# Patient Record
Sex: FEMALE | Race: WHITE | NOT HISPANIC OR LATINO | Employment: UNEMPLOYED | URBAN - METROPOLITAN AREA
[De-identification: names, ages, dates, MRNs, and addresses within clinical notes are randomized per-mention and may not be internally consistent; named-entity substitution may affect disease eponyms.]

---

## 2017-05-17 ENCOUNTER — LAB (OUTPATIENT)
Dept: LAB | Facility: HOSPITAL | Age: 57
End: 2017-05-17
Attending: SPECIALIST

## 2017-05-17 ENCOUNTER — TRANSCRIBE ORDERS (OUTPATIENT)
Dept: LAB | Facility: HOSPITAL | Age: 57
End: 2017-05-17

## 2017-05-17 DIAGNOSIS — F31.70 MIXED BIPOLAR I DISORDER IN REMISSION (HCC): Primary | ICD-10-CM

## 2017-05-17 DIAGNOSIS — F31.70 MIXED BIPOLAR I DISORDER IN REMISSION (HCC): ICD-10-CM

## 2017-05-17 LAB
ALBUMIN SERPL-MCNC: 4.2 G/DL (ref 3.5–5.2)
ALBUMIN/GLOB SERPL: 1.6 G/DL
ALP SERPL-CCNC: 52 U/L (ref 39–117)
ALT SERPL W P-5'-P-CCNC: 17 U/L (ref 1–33)
ANION GAP SERPL CALCULATED.3IONS-SCNC: 12.2 MMOL/L
AST SERPL-CCNC: 15 U/L (ref 1–32)
BILIRUB SERPL-MCNC: 0.2 MG/DL (ref 0.1–1.2)
BUN BLD-MCNC: 18 MG/DL (ref 6–20)
BUN/CREAT SERPL: 14.6 (ref 7–25)
CALCIUM SPEC-SCNC: 10 MG/DL (ref 8.6–10.5)
CHLORIDE SERPL-SCNC: 103 MMOL/L (ref 98–107)
CO2 SERPL-SCNC: 24.8 MMOL/L (ref 22–29)
CREAT BLD-MCNC: 1.23 MG/DL (ref 0.57–1)
GFR SERPL CREATININE-BSD FRML MDRD: 46 ML/MIN/1.73
GLOBULIN UR ELPH-MCNC: 2.7 GM/DL
GLUCOSE BLD-MCNC: 132 MG/DL (ref 65–99)
LITHIUM SERPL-SCNC: 0.7 MMOL/L (ref 0.6–1.2)
POTASSIUM BLD-SCNC: 4.4 MMOL/L (ref 3.5–5.2)
PROT SERPL-MCNC: 6.9 G/DL (ref 6–8.5)
SODIUM BLD-SCNC: 140 MMOL/L (ref 136–145)
T3 SERPL-MCNC: 63.7 NG/DL (ref 80–200)
T4 SERPL-MCNC: 5.81 MCG/DL (ref 4.5–11.7)
TSH SERPL DL<=0.05 MIU/L-ACNC: 2.95 MIU/ML (ref 0.27–4.2)

## 2017-05-17 PROCEDURE — 84480 ASSAY TRIIODOTHYRONINE (T3): CPT

## 2017-05-17 PROCEDURE — 36415 COLL VENOUS BLD VENIPUNCTURE: CPT

## 2017-05-17 PROCEDURE — 80178 ASSAY OF LITHIUM: CPT

## 2017-05-17 PROCEDURE — 84436 ASSAY OF TOTAL THYROXINE: CPT

## 2017-05-17 PROCEDURE — 80053 COMPREHEN METABOLIC PANEL: CPT

## 2017-05-17 PROCEDURE — 84443 ASSAY THYROID STIM HORMONE: CPT

## 2017-05-24 RX ORDER — PROPRANOLOL HYDROCHLORIDE 160 MG/1
CAPSULE, EXTENDED RELEASE ORAL
Qty: 90 CAPSULE | Refills: 1 | Status: SHIPPED | OUTPATIENT
Start: 2017-05-24 | End: 2017-11-20 | Stop reason: SDUPTHER

## 2017-05-24 RX ORDER — BROMOCRIPTINE MESYLATE 2.5 MG/1
TABLET ORAL
Qty: 360 TABLET | Refills: 1 | Status: SHIPPED | OUTPATIENT
Start: 2017-05-24 | End: 2017-11-20 | Stop reason: SDUPTHER

## 2017-05-24 RX ORDER — SIMVASTATIN 40 MG
TABLET ORAL
Qty: 90 TABLET | Refills: 1 | Status: SHIPPED | OUTPATIENT
Start: 2017-05-24 | End: 2017-11-20 | Stop reason: SDUPTHER

## 2017-08-08 DIAGNOSIS — Z00.00 HEALTHCARE MAINTENANCE: Primary | ICD-10-CM

## 2017-08-08 DIAGNOSIS — E22.1 HYPERPROLACTINEMIA (HCC): ICD-10-CM

## 2017-08-08 DIAGNOSIS — E03.9 HYPOTHYROIDISM, UNSPECIFIED TYPE: ICD-10-CM

## 2017-08-08 DIAGNOSIS — E78.2 MIXED HYPERLIPIDEMIA: ICD-10-CM

## 2017-08-08 DIAGNOSIS — D75.89 MACROCYTOSIS: ICD-10-CM

## 2017-08-12 LAB
ALBUMIN SERPL-MCNC: 4.3 G/DL (ref 3.5–5.2)
ALBUMIN/GLOB SERPL: 1.5 G/DL
ALP SERPL-CCNC: 47 U/L (ref 39–117)
ALT SERPL-CCNC: 20 U/L (ref 1–33)
APPEARANCE UR: CLEAR
AST SERPL-CCNC: 13 U/L (ref 1–32)
BACTERIA #/AREA URNS HPF: ABNORMAL /HPF
BACTERIA UR CULT: NORMAL
BACTERIA UR CULT: NORMAL
BASOPHILS # BLD AUTO: 0.04 10*3/MM3 (ref 0–0.2)
BASOPHILS NFR BLD AUTO: 0.4 % (ref 0–1.5)
BILIRUB SERPL-MCNC: 0.2 MG/DL (ref 0.1–1.2)
BILIRUB UR QL STRIP: NEGATIVE
BUN SERPL-MCNC: 18 MG/DL (ref 6–20)
BUN/CREAT SERPL: 14.5 (ref 7–25)
CALCIUM SERPL-MCNC: 10 MG/DL (ref 8.6–10.5)
CHLORIDE SERPL-SCNC: 105 MMOL/L (ref 98–107)
CHOLEST SERPL-MCNC: 200 MG/DL (ref 0–200)
CO2 SERPL-SCNC: 25.4 MMOL/L (ref 22–29)
COLOR UR: YELLOW
CREAT SERPL-MCNC: 1.24 MG/DL (ref 0.57–1)
EOSINOPHIL # BLD AUTO: 0.27 10*3/MM3 (ref 0–0.7)
EOSINOPHIL NFR BLD AUTO: 3 % (ref 0.3–6.2)
EPI CELLS #/AREA URNS HPF: ABNORMAL /HPF
ERYTHROCYTE [DISTWIDTH] IN BLOOD BY AUTOMATED COUNT: 14.7 % (ref 11.7–13)
FOLATE SERPL-MCNC: 9.24 NG/ML (ref 4.78–24.2)
GLOBULIN SER CALC-MCNC: 2.8 GM/DL
GLUCOSE SERPL-MCNC: 91 MG/DL (ref 65–99)
GLUCOSE UR QL: NEGATIVE
HCT VFR BLD AUTO: 41.3 % (ref 35.6–45.5)
HCV AB S/CO SERPL IA: <0.1 S/CO RATIO (ref 0–0.9)
HDLC SERPL-MCNC: 69 MG/DL (ref 40–60)
HGB BLD-MCNC: 12.6 G/DL (ref 11.9–15.5)
HGB UR QL STRIP: NEGATIVE
IMM GRANULOCYTES # BLD: 0.03 10*3/MM3 (ref 0–0.03)
IMM GRANULOCYTES NFR BLD: 0.3 % (ref 0–0.5)
KETONES UR QL STRIP: NEGATIVE
LDLC SERPL CALC-MCNC: 102 MG/DL (ref 0–100)
LEUKOCYTE ESTERASE UR QL STRIP: ABNORMAL
LYMPHOCYTES # BLD AUTO: 2.65 10*3/MM3 (ref 0.9–4.8)
LYMPHOCYTES NFR BLD AUTO: 29.4 % (ref 19.6–45.3)
MCH RBC QN AUTO: 31 PG (ref 26.9–32)
MCHC RBC AUTO-ENTMCNC: 30.5 G/DL (ref 32.4–36.3)
MCV RBC AUTO: 101.5 FL (ref 80.5–98.2)
MICRO URNS: ABNORMAL
MONOCYTES # BLD AUTO: 0.83 10*3/MM3 (ref 0.2–1.2)
MONOCYTES NFR BLD AUTO: 9.2 % (ref 5–12)
MUCOUS THREADS URNS QL MICRO: PRESENT /HPF
NEUTROPHILS # BLD AUTO: 5.19 10*3/MM3 (ref 1.9–8.1)
NEUTROPHILS NFR BLD AUTO: 57.7 % (ref 42.7–76)
NITRITE UR QL STRIP: NEGATIVE
PH UR STRIP: 6 [PH] (ref 5–7.5)
PLATELET # BLD AUTO: 207 10*3/MM3 (ref 140–500)
POTASSIUM SERPL-SCNC: 4.6 MMOL/L (ref 3.5–5.2)
PROT SERPL-MCNC: 7.1 G/DL (ref 6–8.5)
PROT UR QL STRIP: NEGATIVE
RBC # BLD AUTO: 4.07 10*6/MM3 (ref 3.9–5.2)
RBC #/AREA URNS HPF: ABNORMAL /HPF
SODIUM SERPL-SCNC: 142 MMOL/L (ref 136–145)
SP GR UR: 1.01 (ref 1–1.03)
T4 FREE SERPL-MCNC: 1.43 NG/DL (ref 0.93–1.7)
TRIGL SERPL-MCNC: 146 MG/DL (ref 0–150)
TSH SERPL DL<=0.005 MIU/L-ACNC: 3.89 MIU/ML (ref 0.27–4.2)
URINALYSIS REFLEX: ABNORMAL
UROBILINOGEN UR STRIP-MCNC: 0.2 MG/DL (ref 0.2–1)
VIT B12 SERPL-MCNC: 494 PG/ML (ref 211–946)
VLDLC SERPL CALC-MCNC: 29.2 MG/DL (ref 5–40)
WBC # BLD AUTO: 9.01 10*3/MM3 (ref 4.5–10.7)
WBC #/AREA URNS HPF: ABNORMAL /HPF

## 2017-08-17 ENCOUNTER — OFFICE VISIT (OUTPATIENT)
Dept: INTERNAL MEDICINE | Facility: CLINIC | Age: 57
End: 2017-08-17

## 2017-08-17 VITALS
HEIGHT: 69 IN | DIASTOLIC BLOOD PRESSURE: 70 MMHG | OXYGEN SATURATION: 98 % | WEIGHT: 206 LBS | SYSTOLIC BLOOD PRESSURE: 115 MMHG | BODY MASS INDEX: 30.51 KG/M2 | RESPIRATION RATE: 12 BRPM | TEMPERATURE: 97.9 F | HEART RATE: 66 BPM

## 2017-08-17 DIAGNOSIS — R26.89 LOSS OF BALANCE: ICD-10-CM

## 2017-08-17 DIAGNOSIS — F31.9 BIPOLAR 1 DISORDER (HCC): ICD-10-CM

## 2017-08-17 DIAGNOSIS — E22.1 HYPERPROLACTINEMIA (HCC): ICD-10-CM

## 2017-08-17 DIAGNOSIS — Z12.31 ENCOUNTER FOR SCREENING MAMMOGRAM FOR MALIGNANT NEOPLASM OF BREAST: ICD-10-CM

## 2017-08-17 DIAGNOSIS — F25.0 SCHIZOAFFECTIVE DISORDER, BIPOLAR TYPE (HCC): ICD-10-CM

## 2017-08-17 DIAGNOSIS — G47.33 OBSTRUCTIVE SLEEP APNEA SYNDROME: ICD-10-CM

## 2017-08-17 DIAGNOSIS — D75.89 MACROCYTOSIS: ICD-10-CM

## 2017-08-17 DIAGNOSIS — R06.09 DYSPNEA ON EXERTION: ICD-10-CM

## 2017-08-17 DIAGNOSIS — G25.0 HEREDITARY ESSENTIAL TREMOR: ICD-10-CM

## 2017-08-17 DIAGNOSIS — Z00.00 HEALTHCARE MAINTENANCE: Primary | ICD-10-CM

## 2017-08-17 DIAGNOSIS — E03.9 HYPOTHYROIDISM, UNSPECIFIED TYPE: ICD-10-CM

## 2017-08-17 DIAGNOSIS — R42 DIZZINESS: ICD-10-CM

## 2017-08-17 DIAGNOSIS — E78.2 MIXED HYPERLIPIDEMIA: ICD-10-CM

## 2017-08-17 PROCEDURE — 99396 PREV VISIT EST AGE 40-64: CPT | Performed by: INTERNAL MEDICINE

## 2017-08-17 PROCEDURE — G0439 PPPS, SUBSEQ VISIT: HCPCS | Performed by: INTERNAL MEDICINE

## 2017-08-17 RX ORDER — ASENAPINE MALEATE 10 MG/1
TABLET SUBLINGUAL
COMMUNITY
Start: 2017-07-13

## 2017-08-17 NOTE — PROGRESS NOTES
"Subjective       Chief Complaint   Patient presents with   • Annual Exam     review of medical issues       HPI:  Kisha Bourgeois is a 56 y.o. female RTC in yearly CPE/ AWV, review of medical issues:  Has been doing well. Notes is 'tired all the time'.  \"Dr. Betts tells me it is likely my Saphris\".   1. Dizziness - persists. Has not really changed or gone away.   2. Hypothyroidism - adjusted dosing after last labs in 2/2016. Check TSH/ free T4 today.   3. Bipolar/ Schizoaffective D/O - sees psych, with prominent anxiety sx though. Had bipolar episode in 3/2017 and had brief increase in Spahris.  Remains on increased dose alprazolam per psych. Feels like mood is controlled at this time and really episode stemmed from stress over care of elderly mother.   4. Hypothyroidism - on levothyroxine daily on empty stomach.  No recent missed doses.   4. BET - still on propranolol. Thinks tremor has been getting worse. Feels like notices it a lot with picking up a glass or hand writing. Thinks wants to increases.    5. MUNIRA - recalls intolerant to CPAP in past.  Called dentist and talked about MAD. Told it was going to be very expensive.  Not sure of insurance coverage.  Decided not to get MAD device.  Is still snoring at times and  notes is issue when pt sleeps on back.   6. GRANADO - still has sx at times and at times has some challenge getting full deep breath in.  No wheezing noted.  \"It is just like I cant take a deep breath\".   had negative stress test, PFT's inconclusive, CXR normal in past. Had some mild shunting with PFO on ECHO but no recs to intervene on shunt. I suspect sx due to MUNIRA, and tx plan as noted above.   7. HLD - on statin. No issues.    8. Hyperprolactinemia - on bromocriptine, no changes.     The following portions of the patient's history were reviewed and updated as appropriate: allergies, current medications, past family history, past medical history, past social history, past surgical history " and problem list.    Review of Systems   Constitution: Positive for malaise/fatigue and weight gain. Negative for chills and fever.   HENT: Negative for congestion, headaches, hearing loss, odynophagia and sore throat.    Eyes: Negative for discharge, double vision, pain and redness.        Last eye exam 5/2016; wears glasses and contacts     Cardiovascular: Positive for dyspnea on exertion. Negative for chest pain, irregular heartbeat, leg swelling, near-syncope, palpitations and syncope.   Respiratory: Positive for sleep disturbances due to breathing and snoring. Negative for cough, shortness of breath and wheezing.    Skin: Negative for rash and suspicious lesions.   Musculoskeletal: Positive for stiffness (in knees, loosens quickly within minutes). Negative for joint pain, joint swelling, muscle cramps and muscle weakness.   Gastrointestinal: Negative for constipation, diarrhea, dysphagia, heartburn, nausea and vomiting.   Genitourinary: Negative for dysuria, frequency and hematuria.   Neurological: Positive for dizziness (no change) and light-headedness (no change).   Psychiatric/Behavioral: Negative for depression. The patient does not have insomnia and is not nervous/anxious.        Patient Care Team:  Mark Sanchez MD as PCP - General  Mark Sanchez MD as PCP - Family Medicine    Recent Hospitalizations: No recent hospitalization(s).    Depression Screen:   PHQ-9 Depression Screening 8/17/2017   Little interest or pleasure in doing things 1   Feeling down, depressed, or hopeless 1   Trouble falling or staying asleep, or sleeping too much 1   Feeling tired or having little energy 1   Poor appetite or overeating 0   Feeling bad about yourself - or that you are a failure or have let yourself or your family down 0   Trouble concentrating on things, such as reading the newspaper or watching television 1   Moving or speaking so slowly that other people could have noticed. Or the opposite - being so fidgety or  "restless that you have been moving around a lot more than usual 0   Thoughts that you would be better off dead, or of hurting yourself in some way 0   PHQ-9 Total Score 5   If you checked off any problems, how difficult have these problems made it for you to do your work, take care of things at home, or get along with other people? Not difficult at all       Functional and Cognitive Screening:  Functional & Cognitive Status 8/17/2017   Do you have difficulty preparing food and eating? No   Do you have difficulty bathing yourself? No   Do you have difficulty getting dressed? No   Do you have difficulty using the toilet? No   Do you have difficulty moving around from place to place? No   In the past year have you fallen or experienced a near fall? No   Do you need help using the phone?  No   Are you deaf or do you have serious difficulty hearing?  No   Do you need help with transportation? No   Do you need help shopping? No   Do you need help preparing meals?  No   Do you need help with housework?  No   Do you need help with laundry? No   Do you need help taking your medications? No   Do you have difficulty concentrating, remembering or making decisions? No       Does the patient have evidence of cognitive impairment? no    Does not need ASA (and currently is not on it)    Vitals:    08/17/17 1453   BP: 115/70   Pulse: 66   Resp: 12   Temp: 97.9 °F (36.6 °C)   SpO2: 98%   Weight: 206 lb (93.4 kg)   Height: 69\" (175.3 cm)   PainSc: 0-No pain       Body mass index is 30.42 kg/(m^2).    Visual Acuity:  No exam data present    Advanced Care Planning:  has an advance directive - a copy HAS NOT been provided. Have asked the patient to send this to us to add to record.    Objective   Physical Exam   Constitutional: She is oriented to person, place, and time. She appears well-developed and well-nourished. No distress.   HENT:   Head: Normocephalic and atraumatic.   Right Ear: Hearing, tympanic membrane, external ear and ear " canal normal.   Left Ear: Hearing, tympanic membrane, external ear and ear canal normal.   Nose: Nose normal.   Mouth/Throat: Uvula is midline, oropharynx is clear and moist and mucous membranes are normal. No oropharyngeal exudate.   Large brad on hard palate     Eyes: Conjunctivae, EOM and lids are normal. Pupils are equal, round, and reactive to light.   Neck: Trachea normal, normal range of motion and full passive range of motion without pain. Neck supple. Carotid bruit is not present. No thyroid mass and no thyromegaly present.   Cardiovascular: Normal rate, regular rhythm, S1 normal, S2 normal, normal heart sounds and intact distal pulses.  Exam reveals no gallop and no friction rub.    No murmur heard.  Pulses:       Radial pulses are 2+ on the right side, and 2+ on the left side.        Dorsalis pedis pulses are 2+ on the right side, and 2+ on the left side.        Posterior tibial pulses are 2+ on the right side, and 2+ on the left side.   Pulmonary/Chest: Effort normal and breath sounds normal. No respiratory distress. She has no wheezes. She has no rhonchi. She has no rales. She exhibits no mass. Right breast exhibits no inverted nipple, no mass, no nipple discharge and no skin change. Left breast exhibits no inverted nipple, no mass, no nipple discharge and no skin change.   Chaperone present for breast exam   Abdominal: Soft. Normal appearance and bowel sounds are normal. She exhibits no distension and no mass. There is no hepatosplenomegaly. There is no tenderness. There is no rebound and no guarding.   Musculoskeletal: Normal range of motion. She exhibits no edema.       Vascular Status -  Her exam exhibits right foot vasculature abnormal and no right foot edema. Her exam exhibits left foot vasculature abnormal and no left foot edema.  Lymphadenopathy:     She has no cervical adenopathy.     She has no axillary adenopathy.        Right: No inguinal adenopathy present.        Left: No inguinal  adenopathy present.   Neurological: She is alert and oriented to person, place, and time. She has normal strength and normal reflexes. She displays tremor (R > L hand action tremor, no rest component). No cranial nerve deficit or sensory deficit. She exhibits normal muscle tone. Gait normal.   Skin: Skin is warm and dry. No rash noted.   Psychiatric: She has a normal mood and affect. Her behavior is normal. Cognition and memory are normal.   Vitals reviewed.      Assessment/Plan   Problems Addressed this Visit     Bipolar 1 disorder    Relevant Medications    Vortioxetine HBr (TRINTELLIX) 10 MG tablet    SAPHRIS 10 MG sublingual tablet sublingual tablet    Dizziness    Dyspnea on exertion    Hereditary essential tremor    Hyperlipidemia    Hyperprolactinemia    Hypothyroidism    Loss of balance    Macrocytosis    Obstructive sleep apnea syndrome    Relevant Orders    Ambulatory Referral to Sleep Medicine    Schizoaffective disorder    Relevant Medications    Vortioxetine HBr (TRINTELLIX) 10 MG tablet    SAPHRIS 10 MG sublingual tablet sublingual tablet      Other Visit Diagnoses     Healthcare maintenance    -  Primary    Relevant Orders    Mammo Screening Bilateral With CAD    Encounter for screening mammogram for malignant neoplasm of breast         Relevant Orders    Mammo Screening Bilateral With CAD              Diagnoses and all orders for this visit:    Healthcare maintenance  -     Mammo Screening Bilateral With CAD; Future    Macrocytosis    Hypothyroidism, unspecified type    Hereditary essential tremor    Bipolar 1 disorder    Dizziness    Dyspnea on exertion    Mixed hyperlipidemia    Hyperprolactinemia    Obstructive sleep apnea syndrome  -     Ambulatory Referral to Sleep Medicine    Schizoaffective disorder, bipolar type    Loss of balance    Encounter for screening mammogram for malignant neoplasm of breast   -     Mammo Screening Bilateral With CAD; Future    Other orders  -     Vortioxetine HBr  (TRINTELLIX) 10 MG tablet; Take  by mouth Daily.  -     SAPHRIS 10 MG sublingual tablet sublingual tablet;         Kisha Bourgeois is a 56 y.o. female RTC in yearly CPE/ AWV, review of medical issues:   1. Dizziness - persists. Has had negative vestibular testing and has d/w psychiatry in distant past. Pt had stress ECHO 6/2014, small PFO noted but no intervention rec'd by Cards. Holter with only rare extra beats in 6/2014. MRI 2013 f/u on microadenoma with stable findings. Orthostatics negative in office in past.  saw neurology 5/2016 with no specific dx other than suggestions of tx for essential tremor. Etiology remains unclear but issue is not progressive and will monitor for now.   2. Fatigue - main complaint today.  Complex issue as I feel is mutlifactorial including med side effects vs. Relative bradycarida B-blocker effect vs. Untreated MUNIRA.  I have urged pt to revisit the MUNIRA issue and consider tx options.     3. Hypothyroidism - TSH at goal today.  C/W same levothyroxine dosing.   4. Bipolar/ Schizoaffective D/O with prominent anxiety sx element - managed per psych on multidrug regimen. Had bipolar episode in 3/2017 with brief increase in Saphris.  I d/w her potential effect of untreated MUNIRA on mental health and urged consideration of tx.    5. Essential Tremor - progressive despite high dose B-blocker.  Tx challenging as HR limits increase in B-blocker. Pt fatigue makes nearly all other options (Primidone, gabapentin, schedule Clonzepam) challenging.  In addition, Primidone has signficant interactions with Depakote and Trintellix requiring coordination with psych. I have asked pt to discuss options with psych and, if agreeable, release psych to discuss case with me so we may work together on choosing med augmentation.   6. MUNIRA -intolerant to CPAP in past and adamant she cannot do that.  Concerned about cost of dental device. We discussed untreated MUNIRA at length today and comorbid issues as noted above.  Pt  agrees to return to sleep med and discuss alternative tx options. Referral placed.   7. GRANADO - long hx with past negative stress test, PFT's inconclusive, CXR normal in past. Had some mild shunting with PFO on ECHO but no recs to intervene on shunt. I suspect sx due to MUNIRA, and tx plan as noted above.   8. HLD - LDL at goal on statin. LFT's OK.     9. Hyperprolactinemia - on bromocriptine.  Had stable f/u MRI in 2014 after likely false elevation of Prolactin by psych meds.    10. Gastritis - noted on 2013 EGD, but path negative. BAILEE.  11. Macrocytosis - slight progression on labs with no associated cytopenias.  B12 normal on labs.  Monitor.   12. HM - labs d/w pt; Flu - this season; Tdap - UTD; C-scope 2013 (1 polyp) --> 5 years; Pap per gyn due in 2018; Breast exam OK, Mammo ordered today;  add exercise; Preventative care plan provided to pt at end of visit      Return for Annual physical.          Current Outpatient Prescriptions:   •  ALPRAZolam (XANAX) 0.5 MG tablet, Take  by mouth., Disp: , Rfl:   •  bromocriptine (PARLODEL) 2.5 MG tablet, TAKE 4 TABLETS DAILY AT BEDTIME, Disp: 360 tablet, Rfl: 1  •  buPROPion SR (WELLBUTRIN SR) 150 MG 12 hr tablet, Take 3 tablets by mouth daily., Disp: , Rfl:   •  divalproex (DEPAKOTE) 250 MG 24 hr tablet, Take  by mouth., Disp: , Rfl:   •  levothyroxine (SYNTHROID, LEVOTHROID) 100 MCG tablet, Take 1 tablet by mouth daily., Disp: 30 tablet, Rfl: 5  •  lithium (ESKALITH) 450 MG CR tablet, Take  by mouth., Disp: , Rfl:   •  propranolol LA (INDERAL LA) 160 MG 24 hr capsule, TAKE 1 CAPSULE DAILY, Disp: 90 capsule, Rfl: 1  •  simvastatin (ZOCOR) 40 MG tablet, TAKE 1 TABLET DAILY AS DIRECTED, Disp: 90 tablet, Rfl: 1  •  Vortioxetine HBr (TRINTELLIX) 10 MG tablet, Take  by mouth Daily., Disp: , Rfl:   •  SAPHRIS 10 MG sublingual tablet sublingual tablet, , Disp: , Rfl:

## 2017-08-31 ENCOUNTER — HOSPITAL ENCOUNTER (OUTPATIENT)
Dept: SLEEP MEDICINE | Facility: HOSPITAL | Age: 57
Discharge: HOME | End: 2017-08-31
Admitting: NURSE PRACTITIONER

## 2017-08-31 DIAGNOSIS — G47.10 HYPERSOMNIA: Primary | ICD-10-CM

## 2017-08-31 PROCEDURE — G0463 HOSPITAL OUTPT CLINIC VISIT: HCPCS

## 2017-10-25 ENCOUNTER — TRANSCRIBE ORDERS (OUTPATIENT)
Dept: ADMINISTRATIVE | Facility: HOSPITAL | Age: 57
End: 2017-10-25

## 2017-10-25 ENCOUNTER — LAB (OUTPATIENT)
Dept: LAB | Facility: HOSPITAL | Age: 57
End: 2017-10-25
Attending: SPECIALIST

## 2017-10-25 DIAGNOSIS — F31.70 MIXED BIPOLAR I DISORDER IN REMISSION (HCC): ICD-10-CM

## 2017-10-25 DIAGNOSIS — F31.70 MIXED BIPOLAR I DISORDER IN REMISSION (HCC): Primary | ICD-10-CM

## 2017-10-25 LAB
ALBUMIN SERPL-MCNC: 4.4 G/DL (ref 3.5–5.2)
ALBUMIN/GLOB SERPL: 1.6 G/DL
ALP SERPL-CCNC: 55 U/L (ref 39–117)
ALT SERPL W P-5'-P-CCNC: 19 U/L (ref 1–33)
ANION GAP SERPL CALCULATED.3IONS-SCNC: 13.8 MMOL/L
AST SERPL-CCNC: 14 U/L (ref 1–32)
BILIRUB SERPL-MCNC: 0.2 MG/DL (ref 0.1–1.2)
BUN BLD-MCNC: 20 MG/DL (ref 6–20)
BUN/CREAT SERPL: 16.7 (ref 7–25)
CALCIUM SPEC-SCNC: 10.1 MG/DL (ref 8.6–10.5)
CHLORIDE SERPL-SCNC: 105 MMOL/L (ref 98–107)
CO2 SERPL-SCNC: 23.2 MMOL/L (ref 22–29)
CREAT BLD-MCNC: 1.2 MG/DL (ref 0.57–1)
GFR SERPL CREATININE-BSD FRML MDRD: 47 ML/MIN/1.73
GLOBULIN UR ELPH-MCNC: 2.7 GM/DL
GLUCOSE BLD-MCNC: 81 MG/DL (ref 65–99)
LITHIUM SERPL-SCNC: 0.7 MMOL/L (ref 0.6–1.2)
POTASSIUM BLD-SCNC: 4.2 MMOL/L (ref 3.5–5.2)
PROT SERPL-MCNC: 7.1 G/DL (ref 6–8.5)
SODIUM BLD-SCNC: 142 MMOL/L (ref 136–145)
T4 SERPL-MCNC: 6.02 MCG/DL (ref 4.5–11.7)
TSH SERPL DL<=0.05 MIU/L-ACNC: 6.88 MIU/ML (ref 0.27–4.2)

## 2017-10-25 PROCEDURE — 36415 COLL VENOUS BLD VENIPUNCTURE: CPT

## 2017-10-25 PROCEDURE — 80178 ASSAY OF LITHIUM: CPT

## 2017-10-25 PROCEDURE — 84436 ASSAY OF TOTAL THYROXINE: CPT

## 2017-10-25 PROCEDURE — 80053 COMPREHEN METABOLIC PANEL: CPT

## 2017-10-25 PROCEDURE — 84443 ASSAY THYROID STIM HORMONE: CPT

## 2017-11-20 RX ORDER — BROMOCRIPTINE MESYLATE 2.5 MG/1
TABLET ORAL
Qty: 360 TABLET | Refills: 1 | Status: SHIPPED | OUTPATIENT
Start: 2017-11-20 | End: 2020-03-17 | Stop reason: SDUPTHER

## 2017-11-20 RX ORDER — SIMVASTATIN 40 MG
TABLET ORAL
Qty: 90 TABLET | Refills: 1 | Status: SHIPPED | OUTPATIENT
Start: 2017-11-20 | End: 2020-09-08 | Stop reason: SDUPTHER

## 2017-11-20 RX ORDER — PROPRANOLOL HYDROCHLORIDE 160 MG/1
CAPSULE, EXTENDED RELEASE ORAL
Qty: 90 CAPSULE | Refills: 1 | Status: SHIPPED | OUTPATIENT
Start: 2017-11-20 | End: 2020-03-17 | Stop reason: SDUPTHER

## 2018-04-30 ENCOUNTER — APPOINTMENT (OUTPATIENT)
Dept: CT IMAGING | Facility: HOSPITAL | Age: 58
End: 2018-04-30

## 2018-04-30 ENCOUNTER — APPOINTMENT (OUTPATIENT)
Dept: GENERAL RADIOLOGY | Facility: HOSPITAL | Age: 58
End: 2018-04-30

## 2018-04-30 ENCOUNTER — HOSPITAL ENCOUNTER (EMERGENCY)
Facility: HOSPITAL | Age: 58
Discharge: HOME | End: 2018-04-30
Attending: EMERGENCY MEDICINE | Admitting: EMERGENCY MEDICINE

## 2018-04-30 VITALS
WEIGHT: 195 LBS | HEART RATE: 68 BPM | TEMPERATURE: 98.4 F | SYSTOLIC BLOOD PRESSURE: 143 MMHG | HEIGHT: 69 IN | BODY MASS INDEX: 28.88 KG/M2 | DIASTOLIC BLOOD PRESSURE: 88 MMHG | RESPIRATION RATE: 16 BRPM | OXYGEN SATURATION: 99 %

## 2018-04-30 DIAGNOSIS — R53.1 GENERALIZED WEAKNESS: Primary | ICD-10-CM

## 2018-04-30 DIAGNOSIS — F31.9 BIPOLAR 1 DISORDER (HCC): ICD-10-CM

## 2018-04-30 LAB
ALBUMIN SERPL-MCNC: 3.8 G/DL (ref 3.5–5.2)
ALBUMIN/GLOB SERPL: 1.5 G/DL
ALP SERPL-CCNC: 52 U/L (ref 39–117)
ALT SERPL W P-5'-P-CCNC: 15 U/L (ref 1–33)
ANION GAP SERPL CALCULATED.3IONS-SCNC: 6.8 MMOL/L
AST SERPL-CCNC: 15 U/L (ref 1–32)
BASOPHILS # BLD AUTO: 0.02 10*3/MM3 (ref 0–0.2)
BASOPHILS NFR BLD AUTO: 0.3 % (ref 0–1.5)
BILIRUB SERPL-MCNC: <0.2 MG/DL (ref 0.1–1.2)
BILIRUB UR QL STRIP: NEGATIVE
BUN BLD-MCNC: 13 MG/DL (ref 6–20)
BUN/CREAT SERPL: 11.7 (ref 7–25)
CALCIUM SPEC-SCNC: 10.1 MG/DL (ref 8.6–10.5)
CHLORIDE SERPL-SCNC: 108 MMOL/L (ref 98–107)
CLARITY UR: CLEAR
CO2 SERPL-SCNC: 27.2 MMOL/L (ref 22–29)
COLOR UR: YELLOW
CREAT BLD-MCNC: 1.11 MG/DL (ref 0.57–1)
DEPRECATED RDW RBC AUTO: 52.1 FL (ref 37–54)
EOSINOPHIL # BLD AUTO: 0.1 10*3/MM3 (ref 0–0.7)
EOSINOPHIL NFR BLD AUTO: 1.5 % (ref 0.3–6.2)
ERYTHROCYTE [DISTWIDTH] IN BLOOD BY AUTOMATED COUNT: 14.1 % (ref 11.7–13)
GFR SERPL CREATININE-BSD FRML MDRD: 51 ML/MIN/1.73
GLOBULIN UR ELPH-MCNC: 2.6 GM/DL
GLUCOSE BLD-MCNC: 97 MG/DL (ref 65–99)
GLUCOSE BLDC GLUCOMTR-MCNC: 91 MG/DL (ref 70–130)
GLUCOSE UR STRIP-MCNC: NEGATIVE MG/DL
HCT VFR BLD AUTO: 38 % (ref 35.6–45.5)
HGB BLD-MCNC: 11.6 G/DL (ref 11.9–15.5)
HGB UR QL STRIP.AUTO: NEGATIVE
HOLD SPECIMEN: NORMAL
HOLD SPECIMEN: NORMAL
IMM GRANULOCYTES # BLD: 0.02 10*3/MM3 (ref 0–0.03)
IMM GRANULOCYTES NFR BLD: 0.3 % (ref 0–0.5)
KETONES UR QL STRIP: NEGATIVE
LEUKOCYTE ESTERASE UR QL STRIP.AUTO: NEGATIVE
LITHIUM SERPL-SCNC: 1 MMOL/L (ref 0.6–1.2)
LYMPHOCYTES # BLD AUTO: 1.86 10*3/MM3 (ref 0.9–4.8)
LYMPHOCYTES NFR BLD AUTO: 27.5 % (ref 19.6–45.3)
MAGNESIUM SERPL-MCNC: 2.8 MG/DL (ref 1.6–2.6)
MCH RBC QN AUTO: 30.7 PG (ref 26.9–32)
MCHC RBC AUTO-ENTMCNC: 30.5 G/DL (ref 32.4–36.3)
MCV RBC AUTO: 100.5 FL (ref 80.5–98.2)
MONOCYTES # BLD AUTO: 0.61 10*3/MM3 (ref 0.2–1.2)
MONOCYTES NFR BLD AUTO: 9 % (ref 5–12)
NEUTROPHILS # BLD AUTO: 4.16 10*3/MM3 (ref 1.9–8.1)
NEUTROPHILS NFR BLD AUTO: 61.4 % (ref 42.7–76)
NITRITE UR QL STRIP: NEGATIVE
PH UR STRIP.AUTO: 6 [PH] (ref 5–8)
PLATELET # BLD AUTO: 197 10*3/MM3 (ref 140–500)
PMV BLD AUTO: 9.9 FL (ref 6–12)
POTASSIUM BLD-SCNC: 4.6 MMOL/L (ref 3.5–5.2)
PROT SERPL-MCNC: 6.4 G/DL (ref 6–8.5)
PROT UR QL STRIP: NEGATIVE
RBC # BLD AUTO: 3.78 10*6/MM3 (ref 3.9–5.2)
SODIUM BLD-SCNC: 142 MMOL/L (ref 136–145)
SP GR UR STRIP: 1.01 (ref 1–1.03)
TROPONIN T SERPL-MCNC: <0.01 NG/ML (ref 0–0.03)
UROBILINOGEN UR QL STRIP: NORMAL
VALPROATE SERPL-MCNC: 39 MCG/ML (ref 50–125)
WBC NRBC COR # BLD: 6.77 10*3/MM3 (ref 4.5–10.7)
WHOLE BLOOD HOLD SPECIMEN: NORMAL
WHOLE BLOOD HOLD SPECIMEN: NORMAL

## 2018-04-30 PROCEDURE — 93005 ELECTROCARDIOGRAM TRACING: CPT

## 2018-04-30 PROCEDURE — 70450 CT HEAD/BRAIN W/O DYE: CPT

## 2018-04-30 PROCEDURE — 81003 URINALYSIS AUTO W/O SCOPE: CPT | Performed by: EMERGENCY MEDICINE

## 2018-04-30 PROCEDURE — 84484 ASSAY OF TROPONIN QUANT: CPT | Performed by: EMERGENCY MEDICINE

## 2018-04-30 PROCEDURE — 85025 COMPLETE CBC W/AUTO DIFF WBC: CPT | Performed by: EMERGENCY MEDICINE

## 2018-04-30 PROCEDURE — 93005 ELECTROCARDIOGRAM TRACING: CPT | Performed by: EMERGENCY MEDICINE

## 2018-04-30 PROCEDURE — 71045 X-RAY EXAM CHEST 1 VIEW: CPT

## 2018-04-30 PROCEDURE — 80164 ASSAY DIPROPYLACETIC ACD TOT: CPT

## 2018-04-30 PROCEDURE — 93010 ELECTROCARDIOGRAM REPORT: CPT | Performed by: INTERNAL MEDICINE

## 2018-04-30 PROCEDURE — 80178 ASSAY OF LITHIUM: CPT | Performed by: EMERGENCY MEDICINE

## 2018-04-30 PROCEDURE — 82962 GLUCOSE BLOOD TEST: CPT

## 2018-04-30 PROCEDURE — 99284 EMERGENCY DEPT VISIT MOD MDM: CPT

## 2018-04-30 PROCEDURE — 80053 COMPREHEN METABOLIC PANEL: CPT | Performed by: EMERGENCY MEDICINE

## 2018-04-30 PROCEDURE — 83735 ASSAY OF MAGNESIUM: CPT | Performed by: EMERGENCY MEDICINE

## 2018-04-30 RX ORDER — SODIUM CHLORIDE 0.9 % (FLUSH) 0.9 %
10 SYRINGE (ML) INJECTION AS NEEDED
Status: DISCONTINUED | OUTPATIENT
Start: 2018-04-30 | End: 2018-04-30 | Stop reason: HOSPADM

## 2018-05-01 ENCOUNTER — TELEPHONE (OUTPATIENT)
Dept: SOCIAL WORK | Facility: HOSPITAL | Age: 58
End: 2018-05-01

## 2018-05-14 ENCOUNTER — TRANSCRIBE ORDERS (OUTPATIENT)
Dept: LAB | Facility: HOSPITAL | Age: 58
End: 2018-05-14

## 2018-05-14 ENCOUNTER — OFFICE VISIT (OUTPATIENT)
Dept: INTERNAL MEDICINE | Facility: CLINIC | Age: 58
End: 2018-05-14

## 2018-05-14 ENCOUNTER — LAB (OUTPATIENT)
Dept: LAB | Facility: HOSPITAL | Age: 58
End: 2018-05-14
Attending: SPECIALIST

## 2018-05-14 VITALS
WEIGHT: 199 LBS | DIASTOLIC BLOOD PRESSURE: 80 MMHG | BODY MASS INDEX: 29.47 KG/M2 | OXYGEN SATURATION: 97 % | TEMPERATURE: 98 F | HEART RATE: 83 BPM | SYSTOLIC BLOOD PRESSURE: 130 MMHG | HEIGHT: 69 IN

## 2018-05-14 DIAGNOSIS — F31.70 MIXED BIPOLAR I DISORDER IN REMISSION (HCC): ICD-10-CM

## 2018-05-14 DIAGNOSIS — F31.9 BIPOLAR 1 DISORDER (HCC): Primary | ICD-10-CM

## 2018-05-14 DIAGNOSIS — F31.70 MIXED BIPOLAR I DISORDER IN REMISSION (HCC): Primary | ICD-10-CM

## 2018-05-14 DIAGNOSIS — R41.3 TRANSIENT MEMORY LOSS: ICD-10-CM

## 2018-05-14 LAB
AMPHET+METHAMPHET UR QL: NEGATIVE
BARBITURATES UR QL SCN: NEGATIVE
BENZODIAZ UR QL SCN: NEGATIVE
CANNABINOIDS SERPL QL: NEGATIVE
COCAINE UR QL: NEGATIVE
METHADONE UR QL SCN: NEGATIVE
OPIATES UR QL: NEGATIVE
OXYCODONE UR QL SCN: NEGATIVE

## 2018-05-14 PROCEDURE — 80307 DRUG TEST PRSMV CHEM ANLYZR: CPT

## 2018-05-14 PROCEDURE — 99213 OFFICE O/P EST LOW 20 MIN: CPT | Performed by: INTERNAL MEDICINE

## 2018-05-14 NOTE — PROGRESS NOTES
"Other (hospital follow up. )      HPI  Kisha Bourgeois is a 57 y.o. female RTC in f/u after recent ED eval on 4/30/18. Pt woke up and thought had fallen out of bed. Sent  a text and told it did not make sense.   came home and found pt at kitchen counter unresponsive.  Went to ED and pt does not recall that event at all.  Told did not know what happened at all.  Told to come be seen here.   Pt notes she did well next day and went to track and felt OK.  Did go to dinner that night but next day did not feel like she remembered much about that dinner.  Since then \"it has been fine\".  No recurrences to those types of events.  Had taken regular one Xanax night prior to event.   \"Like 24 hours that does not exist\".    Notes two weeks prior to event had a stomach flu, but that sotero gone away days and days prior to event.    No new meds from psych.  D/W psych and \"he did not have much to say\".     Asks about Hep A vaccine as well.     Review of Systems   Constitution: Positive for malaise/fatigue (baseline, not atypical for pt). Negative for chills, fever, weight gain and weight loss.   HENT: Negative for sore throat.    Eyes: Negative for double vision, vision loss in left eye and vision loss in right eye.   Cardiovascular: Negative for chest pain, dyspnea on exertion, irregular heartbeat, leg swelling, near-syncope and palpitations.   Respiratory: Negative for shortness of breath.    Musculoskeletal: Negative for neck pain.   Neurological: Positive for light-headedness (noted on drive over, but did OK.\).   Psychiatric/Behavioral: Negative for depression. The patient is nervous/anxious (baseline).         Mood has been \"OK\".        The following portions of the patient's history were reviewed and updated as appropriate: allergies, current medications, past medical history and problem list.      Current Outpatient Prescriptions:   •  ALPRAZolam (XANAX) 0.5 MG tablet, Take  by mouth., Disp: , Rfl:   •  " "bromocriptine (PARLODEL) 2.5 MG tablet, TAKE 4 TABLETS DAILY AT BEDTIME, Disp: 360 tablet, Rfl: 1  •  buPROPion SR (WELLBUTRIN SR) 150 MG 12 hr tablet, Take 3 tablets by mouth daily., Disp: , Rfl:   •  divalproex (DEPAKOTE) 250 MG 24 hr tablet, Take  by mouth., Disp: , Rfl:   •  levothyroxine (SYNTHROID, LEVOTHROID) 100 MCG tablet, Take 1 tablet by mouth daily., Disp: 30 tablet, Rfl: 5  •  lithium (ESKALITH) 450 MG CR tablet, Take  by mouth., Disp: , Rfl:   •  propranolol LA (INDERAL LA) 160 MG 24 hr capsule, TAKE 1 CAPSULE DAILY, Disp: 90 capsule, Rfl: 1  •  SAPHRIS 10 MG sublingual tablet sublingual tablet, , Disp: , Rfl:   •  simvastatin (ZOCOR) 40 MG tablet, TAKE 1 TABLET DAILY AS DIRECTED, Disp: 90 tablet, Rfl: 1  •  Vortioxetine HBr (TRINTELLIX) 10 MG tablet, Take  by mouth Daily., Disp: , Rfl:     Vitals:    05/14/18 1435   BP: 130/80   Pulse: 83   Temp: 98 °F (36.7 °C)   SpO2: 97%   Weight: 90.3 kg (199 lb)   Height: 175.3 cm (69\")         Physical Exam   Constitutional: She is oriented to person, place, and time. She appears well-developed and well-nourished. No distress.   HENT:   Head: Normocephalic and atraumatic.   Mouth/Throat: Oropharynx is clear and moist. No oropharyngeal exudate.   Eyes: Pupils are equal, round, and reactive to light.   Neck: Normal range of motion. Neck supple. No thyromegaly present.   Cardiovascular: Normal rate, regular rhythm and normal heart sounds.    Pulmonary/Chest: Effort normal and breath sounds normal. No respiratory distress. She has no wheezes. She has no rales.   Musculoskeletal: She exhibits no edema.   Lymphadenopathy:     She has no cervical adenopathy.   Neurological: She is alert and oriented to person, place, and time. She displays tremor (diffuse rest and action tremor). No cranial nerve deficit. She exhibits normal muscle tone. Coordination and gait normal.   Reflex Scores:       Tricep reflexes are 2+ on the right side and 2+ on the left side.       Patellar " reflexes are 2+ on the right side and 2+ on the left side.  Skin: No rash noted.   Psychiatric: She has a normal mood and affect. Her behavior is normal. Thought content normal.   Vitals reviewed.    Current outpatient and discharge medications have been reconciled for the patient.  Reviewed by: Mark Sanchez MD      Assessment/ Plan  Diagnoses and all orders for this visit:    Bipolar 1 disorder    Transient memory loss        Return for Next scheduled follow up.      Discussion:  Kisha Bourgeois is a 57 y.o. female RTC in f/u after recent ED eval on 4/30/18 after found at home by .  Unclear if pt lost consciousness. Pt has bits and pieces of memory from the day but was seen in ED with noted baseline labs and negative CT head.  No arrythmias noted on monitor in ED. Pt went to track the next day and then to dinner with friends and was noted to be fine, though pt had, on retrospect, some lack of memory of dinner on that night. Pt has been back to her baseline since with no recurrence.  I am not sure what to make of event.  Seemingly, pt was at her physical baseline from what I gather in ED notes and was OK at social events next day, but only issue is scattered recollection.  Does not have consistent loss of memory to suggest global amnesia, transiet or any witnessed seizure activity. Pt is on numerous psych meds, but they have been stable and pt has been seen by psych since with no changes. Pt appears at her baseline today. Reassured pt, but she is to call if any recurrence or new sx. Will hold on additional work-up at this time.

## 2018-10-16 ENCOUNTER — LAB (OUTPATIENT)
Dept: LAB | Facility: HOSPITAL | Age: 58
End: 2018-10-16
Attending: SPECIALIST

## 2018-10-16 ENCOUNTER — TRANSCRIBE ORDERS (OUTPATIENT)
Dept: ADMINISTRATIVE | Facility: HOSPITAL | Age: 58
End: 2018-10-16

## 2018-10-16 DIAGNOSIS — Z79.899 LONG TERM USE OF DRUG: ICD-10-CM

## 2018-10-16 DIAGNOSIS — F31.70 BIPOLAR DISORDER IN PARTIAL REMISSION, MOST RECENT EPISODE UNSPECIFIED TYPE (HCC): ICD-10-CM

## 2018-10-16 DIAGNOSIS — Z79.899 LONG TERM USE OF DRUG: Primary | ICD-10-CM

## 2018-10-16 LAB
AMMONIA BLD-SCNC: 41 UMOL/L (ref 11–51)
LITHIUM SERPL-SCNC: 0.8 MMOL/L (ref 0.6–1.2)
VALPROATE SERPL-MCNC: 39 MCG/ML (ref 50–125)

## 2018-10-16 PROCEDURE — 80164 ASSAY DIPROPYLACETIC ACD TOT: CPT

## 2018-10-16 PROCEDURE — 36415 COLL VENOUS BLD VENIPUNCTURE: CPT

## 2018-10-16 PROCEDURE — 80178 ASSAY OF LITHIUM: CPT

## 2018-10-16 PROCEDURE — 82140 ASSAY OF AMMONIA: CPT

## 2018-12-24 RX ORDER — LEVOTHYROXINE SODIUM 0.1 MG/1
TABLET ORAL
Qty: 90 TABLET | Refills: 3 | Status: SHIPPED | OUTPATIENT
Start: 2018-12-24 | End: 2020-02-20 | Stop reason: SDUPTHER

## 2019-08-30 ENCOUNTER — LAB (OUTPATIENT)
Dept: LAB | Facility: HOSPITAL | Age: 59
End: 2019-08-30

## 2019-08-30 ENCOUNTER — TRANSCRIBE ORDERS (OUTPATIENT)
Dept: ADMINISTRATIVE | Facility: HOSPITAL | Age: 59
End: 2019-08-30

## 2019-08-30 DIAGNOSIS — F31.71 BIPOLAR DISORDER, IN PARTIAL REMISSION, MOST RECENT EPISODE HYPOMANIC (HCC): ICD-10-CM

## 2019-08-30 DIAGNOSIS — F31.71 BIPOLAR DISORDER, IN PARTIAL REMISSION, MOST RECENT EPISODE HYPOMANIC (HCC): Primary | ICD-10-CM

## 2019-08-30 LAB
ALBUMIN SERPL-MCNC: 4.5 G/DL (ref 3.5–5.2)
ALBUMIN/GLOB SERPL: 2 G/DL
ALP SERPL-CCNC: 54 U/L (ref 39–117)
ALT SERPL W P-5'-P-CCNC: 15 U/L (ref 1–33)
AMMONIA BLD-SCNC: 27 UMOL/L (ref 11–51)
ANION GAP SERPL CALCULATED.3IONS-SCNC: 11.8 MMOL/L (ref 5–15)
AST SERPL-CCNC: 12 U/L (ref 1–32)
BILIRUB SERPL-MCNC: 0.2 MG/DL (ref 0.2–1.2)
BUN BLD-MCNC: 12 MG/DL (ref 6–20)
BUN/CREAT SERPL: 10.4 (ref 7–25)
CALCIUM SPEC-SCNC: 9.7 MG/DL (ref 8.6–10.5)
CHLORIDE SERPL-SCNC: 103 MMOL/L (ref 98–107)
CO2 SERPL-SCNC: 24.2 MMOL/L (ref 22–29)
CREAT BLD-MCNC: 1.15 MG/DL (ref 0.57–1)
GFR SERPL CREATININE-BSD FRML MDRD: 49 ML/MIN/1.73
GLOBULIN UR ELPH-MCNC: 2.3 GM/DL
GLUCOSE BLD-MCNC: 120 MG/DL (ref 65–99)
LITHIUM SERPL-SCNC: 0.8 MMOL/L (ref 0.6–1.2)
POTASSIUM BLD-SCNC: 4.1 MMOL/L (ref 3.5–5.2)
PROT SERPL-MCNC: 6.8 G/DL (ref 6–8.5)
SODIUM BLD-SCNC: 139 MMOL/L (ref 136–145)
T-UPTAKE NFR SERPL: 1.02 TBI (ref 0.8–1.3)
T3 SERPL-MCNC: 68.1 NG/DL (ref 80–200)
T4 SERPL-MCNC: 5.84 MCG/DL (ref 4.5–11.7)
T4 SERPL-MCNC: 5.84 MCG/DL (ref 4.5–11.7)
TSH SERPL DL<=0.05 MIU/L-ACNC: 4.6 UIU/ML (ref 0.27–4.2)
VALPROATE SERPL-MCNC: 50 MCG/ML (ref 50–125)

## 2019-08-30 PROCEDURE — 84436 ASSAY OF TOTAL THYROXINE: CPT

## 2019-08-30 PROCEDURE — 84479 ASSAY OF THYROID (T3 OR T4): CPT

## 2019-08-30 PROCEDURE — 84443 ASSAY THYROID STIM HORMONE: CPT

## 2019-08-30 PROCEDURE — 82140 ASSAY OF AMMONIA: CPT

## 2019-08-30 PROCEDURE — 36415 COLL VENOUS BLD VENIPUNCTURE: CPT

## 2019-08-30 PROCEDURE — 80164 ASSAY DIPROPYLACETIC ACD TOT: CPT

## 2019-08-30 PROCEDURE — 80178 ASSAY OF LITHIUM: CPT

## 2019-08-30 PROCEDURE — 84480 ASSAY TRIIODOTHYRONINE (T3): CPT

## 2019-08-30 PROCEDURE — 80053 COMPREHEN METABOLIC PANEL: CPT

## 2020-02-20 RX ORDER — LEVOTHYROXINE SODIUM 0.1 MG/1
100 TABLET ORAL DAILY
Qty: 30 TABLET | Refills: 0 | Status: SHIPPED | OUTPATIENT
Start: 2020-02-20 | End: 2020-03-17 | Stop reason: SDUPTHER

## 2020-02-27 DIAGNOSIS — D75.89 MACROCYTOSIS: ICD-10-CM

## 2020-02-27 DIAGNOSIS — E22.1 HYPERPROLACTINEMIA (HCC): ICD-10-CM

## 2020-02-27 DIAGNOSIS — Z00.00 HEALTHCARE MAINTENANCE: Primary | ICD-10-CM

## 2020-02-27 DIAGNOSIS — E78.2 MIXED HYPERLIPIDEMIA: ICD-10-CM

## 2020-02-27 DIAGNOSIS — E03.9 HYPOTHYROIDISM, UNSPECIFIED TYPE: ICD-10-CM

## 2020-03-08 LAB
ALBUMIN SERPL-MCNC: 4.2 G/DL (ref 3.5–5.2)
ALBUMIN/GLOB SERPL: 1.8 G/DL
ALP SERPL-CCNC: 47 U/L (ref 39–117)
ALT SERPL-CCNC: 17 U/L (ref 1–33)
APPEARANCE UR: ABNORMAL
AST SERPL-CCNC: 10 U/L (ref 1–32)
BACTERIA #/AREA URNS HPF: ABNORMAL /HPF
BACTERIA UR CULT: NORMAL
BACTERIA UR CULT: NORMAL
BASOPHILS # BLD AUTO: 0.07 10*3/MM3 (ref 0–0.2)
BASOPHILS NFR BLD AUTO: 0.9 % (ref 0–1.5)
BILIRUB SERPL-MCNC: 0.2 MG/DL (ref 0.2–1.2)
BILIRUB UR QL STRIP: NEGATIVE
BUN SERPL-MCNC: 16 MG/DL (ref 6–20)
BUN/CREAT SERPL: 11.8 (ref 7–25)
CALCIUM SERPL-MCNC: 9.9 MG/DL (ref 8.6–10.5)
CHLORIDE SERPL-SCNC: 106 MMOL/L (ref 98–107)
CHOLEST SERPL-MCNC: 167 MG/DL (ref 0–200)
CO2 SERPL-SCNC: 24.6 MMOL/L (ref 22–29)
COLOR UR: YELLOW
CREAT SERPL-MCNC: 1.36 MG/DL (ref 0.57–1)
CRYSTALS URNS MICRO: ABNORMAL
EOSINOPHIL # BLD AUTO: 0.17 10*3/MM3 (ref 0–0.4)
EOSINOPHIL NFR BLD AUTO: 2.2 % (ref 0.3–6.2)
EPI CELLS #/AREA URNS HPF: ABNORMAL /HPF (ref 0–10)
ERYTHROCYTE [DISTWIDTH] IN BLOOD BY AUTOMATED COUNT: 12.8 % (ref 12.3–15.4)
GLOBULIN SER CALC-MCNC: 2.4 GM/DL
GLUCOSE SERPL-MCNC: 97 MG/DL (ref 65–99)
GLUCOSE UR QL: NEGATIVE
HCT VFR BLD AUTO: 35.7 % (ref 34–46.6)
HDLC SERPL-MCNC: 57 MG/DL (ref 40–60)
HGB BLD-MCNC: 12.3 G/DL (ref 12–15.9)
HGB UR QL STRIP: NEGATIVE
IMM GRANULOCYTES # BLD AUTO: 0.03 10*3/MM3 (ref 0–0.05)
IMM GRANULOCYTES NFR BLD AUTO: 0.4 % (ref 0–0.5)
KETONES UR QL STRIP: NEGATIVE
LDLC SERPL CALC-MCNC: 77 MG/DL (ref 0–100)
LEUKOCYTE ESTERASE UR QL STRIP: ABNORMAL
LYMPHOCYTES # BLD AUTO: 2.49 10*3/MM3 (ref 0.7–3.1)
LYMPHOCYTES NFR BLD AUTO: 31.7 % (ref 19.6–45.3)
MCH RBC QN AUTO: 32.1 PG (ref 26.6–33)
MCHC RBC AUTO-ENTMCNC: 34.5 G/DL (ref 31.5–35.7)
MCV RBC AUTO: 93.2 FL (ref 79–97)
MICRO URNS: ABNORMAL
MONOCYTES # BLD AUTO: 0.68 10*3/MM3 (ref 0.1–0.9)
MONOCYTES NFR BLD AUTO: 8.7 % (ref 5–12)
MUCOUS THREADS URNS QL MICRO: PRESENT /HPF
NEUTROPHILS # BLD AUTO: 4.41 10*3/MM3 (ref 1.7–7)
NEUTROPHILS NFR BLD AUTO: 56.1 % (ref 42.7–76)
NITRITE UR QL STRIP: NEGATIVE
NRBC BLD AUTO-RTO: 0 /100 WBC (ref 0–0.2)
PH UR STRIP: 6.5 [PH] (ref 5–7.5)
PLATELET # BLD AUTO: 231 10*3/MM3 (ref 140–450)
POTASSIUM SERPL-SCNC: 4.5 MMOL/L (ref 3.5–5.2)
PROT SERPL-MCNC: 6.6 G/DL (ref 6–8.5)
PROT UR QL STRIP: NEGATIVE
RBC # BLD AUTO: 3.83 10*6/MM3 (ref 3.77–5.28)
RBC #/AREA URNS HPF: ABNORMAL /HPF (ref 0–2)
SODIUM SERPL-SCNC: 142 MMOL/L (ref 136–145)
SP GR UR: 1.01 (ref 1–1.03)
T4 FREE SERPL-MCNC: 1.57 NG/DL (ref 0.93–1.7)
TRIGL SERPL-MCNC: 163 MG/DL (ref 0–150)
TSH SERPL DL<=0.005 MIU/L-ACNC: 1.7 UIU/ML (ref 0.27–4.2)
UNIDENT CRYS URNS QL MICRO: PRESENT /LPF
URINALYSIS REFLEX: ABNORMAL
UROBILINOGEN UR STRIP-MCNC: 0.2 MG/DL (ref 0.2–1)
VLDLC SERPL CALC-MCNC: 32.6 MG/DL
WBC # BLD AUTO: 7.85 10*3/MM3 (ref 3.4–10.8)
WBC #/AREA URNS HPF: ABNORMAL /HPF (ref 0–5)

## 2020-03-13 ENCOUNTER — OFFICE VISIT (OUTPATIENT)
Dept: INTERNAL MEDICINE | Facility: CLINIC | Age: 60
End: 2020-03-13

## 2020-03-13 VITALS
DIASTOLIC BLOOD PRESSURE: 86 MMHG | TEMPERATURE: 98.8 F | WEIGHT: 198 LBS | HEART RATE: 66 BPM | BODY MASS INDEX: 29.33 KG/M2 | SYSTOLIC BLOOD PRESSURE: 124 MMHG | HEIGHT: 69 IN | RESPIRATION RATE: 12 BRPM | OXYGEN SATURATION: 94 %

## 2020-03-13 DIAGNOSIS — F25.0 SCHIZOAFFECTIVE DISORDER, BIPOLAR TYPE (HCC): ICD-10-CM

## 2020-03-13 DIAGNOSIS — D12.6 ADENOMATOUS POLYP OF COLON, UNSPECIFIED PART OF COLON: ICD-10-CM

## 2020-03-13 DIAGNOSIS — E03.9 HYPOTHYROIDISM, UNSPECIFIED TYPE: ICD-10-CM

## 2020-03-13 DIAGNOSIS — F31.9 BIPOLAR 1 DISORDER (HCC): ICD-10-CM

## 2020-03-13 DIAGNOSIS — G47.33 OBSTRUCTIVE SLEEP APNEA SYNDROME: ICD-10-CM

## 2020-03-13 DIAGNOSIS — G25.0 HEREDITARY ESSENTIAL TREMOR: ICD-10-CM

## 2020-03-13 DIAGNOSIS — Z12.31 ENCOUNTER FOR SCREENING MAMMOGRAM FOR MALIGNANT NEOPLASM OF BREAST: ICD-10-CM

## 2020-03-13 DIAGNOSIS — Z00.00 HEALTHCARE MAINTENANCE: Primary | ICD-10-CM

## 2020-03-13 DIAGNOSIS — E22.1 HYPERPROLACTINEMIA (HCC): ICD-10-CM

## 2020-03-13 DIAGNOSIS — E78.2 MIXED HYPERLIPIDEMIA: ICD-10-CM

## 2020-03-13 PROCEDURE — G0439 PPPS, SUBSEQ VISIT: HCPCS | Performed by: INTERNAL MEDICINE

## 2020-03-13 PROCEDURE — 99396 PREV VISIT EST AGE 40-64: CPT | Performed by: INTERNAL MEDICINE

## 2020-03-13 RX ORDER — PROPRANOLOL HCL 60 MG
60 CAPSULE, EXTENDED RELEASE 24HR ORAL DAILY
Qty: 90 CAPSULE | Refills: 3 | Status: SHIPPED | OUTPATIENT
Start: 2020-03-13 | End: 2020-03-17 | Stop reason: SDUPTHER

## 2020-03-13 NOTE — PATIENT INSTRUCTIONS
Shingrix (new shingles shot; 2 shot series) check at pharmacy  Hepatitis A (2 shot series) check at pharmacy    Medicare Wellness  Personal Prevention Plan of Service     Date of Office Visit:  2020  Encounter Provider:  Mark Sanchez MD  Place of Service:  [facility]  Patient Name: Kisha Bourgeois  :  1960    As part of the Medicare Wellness portion of your visit today, we are providing you with this personalized preventive plan of services (PPPS). This plan is based upon recommendations of the [place] Preventive Services Task Force (USPSTF) and the Advisory Committee on Immunization Practices (ACIP).    This lists the preventive care services that should be considered, and provides dates of when you are due. Items listed as completed are up-to-date and do not require any further intervention.    Health Maintenance   Topic Date Due   • ZOSTER VACCINE (1 of 2) 2012   • MAMMOGRAM  2016   • PAP SMEAR  2016   • TDAP/TD VACCINES (2 - Td) 2021   • LIPID PANEL  2021   • MEDICARE ANNUAL WELLNESS  2021   • COLONOSCOPY  2023   • HEPATITIS C SCREENING  Completed   • INFLUENZA VACCINE  Addressed       Orders Placed This Encounter   Procedures   • Mammo Screening Bilateral With CAD     Standing Status:   Future     Standing Expiration Date:   3/13/2021     Order Specific Question:   Reason for Exam:     Answer:   Screening   • Ambulatory Referral to Sleep Medicine     Referral Priority:   Routine     Referral Type:   Consultation     Referral Reason:   Specialty Services Required     Requested Specialty:   Sleep Medicine     Number of Visits Requested:   1   • Ambulatory Referral For Screening Colonoscopy     Referral Priority:   Routine     Referral Type:   Diagnostic Medical     Referral Reason:   Specialty Services Required     Referred to Provider:   Antonino Drake MD     Number of Visits Requested:   1       Return in  about 8 weeks (around 5/8/2020).

## 2020-03-13 NOTE — PROGRESS NOTES
"Subjective     Chief Complaint   Patient presents with   • Annual Exam     review of medical issues    • Tremors       HPI:   Kisha Bourgeois is a 59 y.o. female RTC in yearly CPE/ AWV, review of medical issues:  Has been doing well overall. Feels like does well overall.  Has some issues with lethargy in AM and will take until afternoon to get going.  Will get anxiety over getting anywhere early in day.    1. Hypothyroidism - on levothyroxine daily, 'not always on an empty stomach'.   Does not miss doses 'Not usually'.   2. Bipolar/ Schizoaffective D/O with prominent anxiety sx element - Feels like mood has been stable overall. No med changes.  Last time saw pscyh was about 6 weeks ago.  managed per psych on multidrug regimen. Had bipolar episode in 3/2017 with brief increase in Saphris.  I d/w her potential effect of untreated MUNIRA on mental health and urged consideration of tx.    3. Essential Tremor - progressive despite high dose B-blocker.  \"today it is pretty good\".  Feels like some days it is worse, thinks tea will affect with caffeine.  However late at night it will be bad too.  Did talk to psych about this and he 'asked me if I had seen neurologist'.  Neuro in past placed on high dose B-blocker but never did go back.  Will have some ETOH and 'sometiimes makes tremor better, not all the time;'.    4. MUNIRA -intolerant to CPAP in past and adamant she cannot do that.  Has not moved on dental device.  Did not f/u on that issue. Is not sure about insurance coverage for device.   5. HLD -  on statin. no issues.   6. Hyperprolactinemia - on bromocriptine. No change.  No double vision or new HA there.   7. HM - did not get Flu shot, just never made it to pharmacy;  C-scope 2013 (1 polyp) --> 5 years but did not get scope done.       The following portions of the patient's history were reviewed and updated as appropriate: allergies, current medications, past family history, past medical history, past social history, " past surgical history and problem list.    Review of Systems   Constitution: Positive for malaise/fatigue. Negative for chills, fever, weight gain and weight loss.   HENT: Negative for congestion, hearing loss, odynophagia and sore throat.    Eyes: Negative for discharge, double vision, pain and redness.        Last eye exam ~4/2019; wears glasses and contacts     Cardiovascular: Negative for chest pain, dyspnea on exertion, irregular heartbeat, leg swelling, near-syncope, palpitations and syncope.   Respiratory: Positive for cough (mild, daily in afternoon, thinks is anxiety), sleep disturbances due to breathing and snoring. Negative for shortness of breath.    Hematologic/Lymphatic: Negative for bleeding problem. Does not bruise/bleed easily.   Skin: Negative for rash and suspicious lesions.   Musculoskeletal: Negative for arthritis, joint pain, joint swelling, muscle cramps, muscle weakness and myalgias.   Gastrointestinal: Negative for constipation, diarrhea, dysphagia (pills stick, not new, bite of food will get them down), heartburn, nausea and vomiting.   Genitourinary: Negative for bladder incontinence, dysuria, frequency, hematuria and hesitancy.        Saw Gyn 2 years ago and missed mammo last year.      Neurological: Positive for light-headedness (variable but thinks is med related). Negative for dizziness and headaches.   Psychiatric/Behavioral: Negative for depression. The patient is nervous/anxious. The patient does not have insomnia.    Allergic/Immunologic: Negative for environmental allergies and persistent infections.       Patient Care Team:  Mark Sanchez MD as PCP - General  Mark Sanchez MD as PCP - Family Medicine    Recent Hospitalizations: No recent hospitalization(s).    Depression Screen:   PHQ-2/PHQ-9 Depression Screening 3/13/2020   Little interest or pleasure in doing things 0   Feeling down, depressed, or hopeless 0   Trouble falling or staying asleep, or sleeping too much 0    Feeling tired or having little energy 0   Poor appetite or overeating 0   Feeling bad about yourself - or that you are a failure or have let yourself or your family down 0   Trouble concentrating on things, such as reading the newspaper or watching television 0   Moving or speaking so slowly that other people could have noticed. Or the opposite - being so fidgety or restless that you have been moving around a lot more than usual 0   Thoughts that you would be better off dead, or of hurting yourself in some way 0   Total Score 0   If you checked off any problems, how difficult have these problems made it for you to do your work, take care of things at home, or get along with other people? Not difficult at all       Functional and Cognitive Screening:  Functional & Cognitive Status 3/13/2020   Do you have difficulty preparing food and eating? No   Do you have difficulty bathing yourself, getting dressed or grooming yourself? No   Do you have difficulty using the toilet? No   Do you have difficulty moving around from place to place? No   Do you have trouble with steps or getting out of a bed or a chair? No   Current Diet Limited Junk Food   Dental Exam Up to date   Eye Exam Up to date   Exercise (times per week) 0 times per week   Current Exercise Activities Include None   Do you need help using the phone?  No   Are you deaf or do you have serious difficulty hearing?  No   Do you need help with transportation? Yes   Do you need help shopping? No   Do you need help preparing meals?  No   Do you need help with housework?  No   Do you need help with laundry? No   Do you need help taking your medications? No   Do you need help managing money? No   Do you ever drive or ride in a car without wearing a seat belt? No   Have you felt unusual stress, anger or loneliness in the last month? No   Who do you live with? Spouse   If you need help, do you have trouble finding someone available to you? No   Have you been bothered in  "the last four weeks by sexual problems? No   Do you have difficulty concentrating, remembering or making decisions? No       Does the patient have evidence of cognitive impairment? no    Does not need ASA (and currently is not on it)    Vitals:    03/13/20 1455   BP: 124/86   Pulse: 66   Resp: 12   Temp: 98.8 °F (37.1 °C)   SpO2: 94%   Weight: 89.8 kg (198 lb)   Height: 175.3 cm (69\")   PainSc: 0-No pain       Body mass index is 29.24 kg/m².    Visual Acuity:  No exam data present    Advanced Care Planning:  ACP discussion was held with the patient during this visit. Patient does not have an advance directive, information provided.    Objective   Physical Exam   Constitutional: She is oriented to person, place, and time. She appears well-developed and well-nourished. No distress.   HENT:   Head: Normocephalic and atraumatic.   Right Ear: Hearing, tympanic membrane, external ear and ear canal normal.   Left Ear: Hearing, tympanic membrane, external ear and ear canal normal.   Nose: Nose normal.   Mouth/Throat: Uvula is midline, oropharynx is clear and moist and mucous membranes are normal. No oropharyngeal exudate.   Eyes: Pupils are equal, round, and reactive to light. Conjunctivae, EOM and lids are normal. Right eye exhibits no discharge. Left eye exhibits no discharge. No scleral icterus.   Neck: Trachea normal, normal range of motion and full passive range of motion without pain. Neck supple. Carotid bruit is not present. No thyroid mass and no thyromegaly present.   Cardiovascular: Normal rate, regular rhythm, S1 normal, S2 normal, normal heart sounds and intact distal pulses. Exam reveals no gallop and no friction rub.   No murmur heard.  Pulses:       Radial pulses are 2+ on the right side, and 2+ on the left side.        Dorsalis pedis pulses are 2+ on the right side, and 2+ on the left side.        Posterior tibial pulses are 2+ on the right side, and 2+ on the left side.   Pulmonary/Chest: Effort normal and " breath sounds normal. No respiratory distress. She has no wheezes. She has no rhonchi. She has no rales. She exhibits no mass. Right breast exhibits no inverted nipple, no mass, no nipple discharge and no skin change. Left breast exhibits no inverted nipple, no mass, no nipple discharge and no skin change.   Chaperone present   Abdominal: Soft. Normal appearance and bowel sounds are normal. She exhibits no distension and no mass. There is no hepatosplenomegaly. There is no tenderness. There is no rebound and no guarding.   Musculoskeletal: Normal range of motion. She exhibits no edema.     Vascular Status -  Her right foot exhibits normal foot vasculature  and no edema. Her left foot exhibits normal foot vasculature  and no edema.  Lymphadenopathy:     She has no cervical adenopathy.     She has no axillary adenopathy.        Right: No inguinal adenopathy present.        Left: No inguinal adenopathy present.   Neurological: She is alert and oriented to person, place, and time. She has normal strength. She displays tremor (Hands > feet action and rest tremor, amplified with action). No cranial nerve deficit or sensory deficit. She exhibits normal muscle tone. Gait normal.   Reflex Scores:       Patellar reflexes are 1+ on the right side and 1+ on the left side.       Achilles reflexes are 1+ on the right side and 1+ on the left side.  Skin: Skin is warm and dry. No rash noted.   Psychiatric: She has a normal mood and affect. Her behavior is normal. Cognition and memory are normal.   Vitals reviewed.      Assessment/Plan   Problems Addressed this Visit     Hypothyroidism    Relevant Medications    propranolol LA (INDERAL LA) 60 MG 24 hr capsule    Hereditary essential tremor    Relevant Medications    propranolol LA (INDERAL LA) 60 MG 24 hr capsule    Bipolar 1 disorder (CMS/HCC)    Hyperlipidemia    Hyperprolactinemia (CMS/HCC)    Obstructive sleep apnea syndrome    Relevant Orders    Ambulatory Referral to Sleep  Medicine    Schizoaffective disorder (CMS/HCC)    Adenomatous polyp of colon    Relevant Orders    Ambulatory Referral For Screening Colonoscopy      Other Visit Diagnoses     Healthcare maintenance    -  Primary    Relevant Orders    Ambulatory Referral For Screening Colonoscopy    Mammo Screening Bilateral With CAD    Encounter for screening mammogram for malignant neoplasm of breast         Relevant Orders    Mammo Screening Bilateral With CAD              Diagnoses and all orders for this visit:    Healthcare maintenance  -     Ambulatory Referral For Screening Colonoscopy  -     Mammo Screening Bilateral With CAD; Future    Hereditary essential tremor  -     propranolol LA (INDERAL LA) 60 MG 24 hr capsule; Take 1 capsule by mouth Daily. Along with 160mg dose    Mixed hyperlipidemia    Obstructive sleep apnea syndrome  -     Ambulatory Referral to Sleep Medicine    Hyperprolactinemia (CMS/HCC)    Hypothyroidism, unspecified type    Bipolar 1 disorder (CMS/HCC)    Schizoaffective disorder, bipolar type (CMS/HCC)    Adenomatous polyp of colon, unspecified part of colon  -     Ambulatory Referral For Screening Colonoscopy    Encounter for screening mammogram for malignant neoplasm of breast   -     Mammo Screening Bilateral With CAD; Future        Kisha Bourgeois is a 59 y.o. female RTC in yearly CPE/ AWV, review of medical issues:   1. Fatigue - remains a consistent complain.  Complex issue as I feel is mutlifactorial including med side effects vs. Relative bradycarida B-blocker effect vs. Untreated MUNIRA.  I have urged pt to revisit the MUNIRA issue and consider tx options, referral placed again today.   2. Hypothyroidism -TSH at goal on levothyroxine daily. Take on empty stomach daily.   3. Bipolar/ Schizoaffective D/O with prominent anxiety sx element - managed per psych on multidrug regimen.  I d/w her again the potential effect of untreated MUNIRA on mental health and urged consideration of tx.    4. Essential Tremor  - progressive despite high dose B-blocker.  PT desires increase in B-blocker dosing, will do so cautiously as worried about bradycardia nad worsened fatigue.  Tx otherwise challenging as HR limits too much increase in B-blocker. Pt fatigue makes nearly all other options (Primidone, gabapentin, schedule Clonzepam) challenging.  In addition, Primidone has signficant interactions with Depakote and Trintellix requiring coordination with psych. I have asked pt to discuss options with psych and, if agreeable, release psych to discuss case with me so we may work together on choosing med augmentation. Reassess pt in 8 weeks after sees psych again.   5. MUNIRA - intolerant to CPAP in past.  Urged pt to see sleep med for tx options or dental device. Referral placed.   6. HLD - LDL at goal on statin. LFT's OK.     7. Hyperprolactinemia - on bromocriptine.  Had stable f/u MRI in 2014 after likely false elevation of Prolactin by psych meds.    8. HM - labs d/w pt; Flu - next season; Tdap - UTD; Shingrix and Hep A at pharmacy; C-scope 2013 (1 polyp) --> 5 years, due and referral placed; Pap per gyn due in 2018, pt to make appt; Breast exam OK, Mammo ordered today;  add exercise; Preventative care plan provided to pt at end of visit    Return in about 8 weeks (around 5/8/2020).          Current Outpatient Medications:   •  ALPRAZolam (XANAX) 0.5 MG tablet, Take  by mouth., Disp: , Rfl:   •  bromocriptine (PARLODEL) 2.5 MG tablet, TAKE 4 TABLETS DAILY AT BEDTIME, Disp: 360 tablet, Rfl: 1  •  buPROPion SR (WELLBUTRIN SR) 150 MG 12 hr tablet, Take 3 tablets by mouth daily., Disp: , Rfl:   •  divalproex (DEPAKOTE) 250 MG 24 hr tablet, Take  by mouth., Disp: , Rfl:   •  levothyroxine (SYNTHROID, LEVOTHROID) 100 MCG tablet, Take 1 tablet by mouth Daily., Disp: 30 tablet, Rfl: 0  •  lithium (ESKALITH) 450 MG CR tablet, Take  by mouth., Disp: , Rfl:   •  propranolol LA (INDERAL LA) 160 MG 24 hr capsule, TAKE 1 CAPSULE DAILY, Disp: 90  capsule, Rfl: 1  •  SAPHRIS 10 MG sublingual tablet sublingual tablet, , Disp: , Rfl:   •  simvastatin (ZOCOR) 40 MG tablet, TAKE 1 TABLET DAILY AS DIRECTED, Disp: 90 tablet, Rfl: 1  •  Vortioxetine HBr (TRINTELLIX) 10 MG tablet, Take  by mouth Daily., Disp: , Rfl:   •  propranolol LA (INDERAL LA) 60 MG 24 hr capsule, Take 1 capsule by mouth Daily. Along with 160mg dose, Disp: 90 capsule, Rfl: 3  Answers for HPI/ROS submitted by the patient on 3/11/2020   What is the primary reason for your visit?: Physical

## 2020-03-17 DIAGNOSIS — G25.0 HEREDITARY ESSENTIAL TREMOR: ICD-10-CM

## 2020-03-17 RX ORDER — BROMOCRIPTINE MESYLATE 2.5 MG/1
TABLET ORAL
Qty: 360 TABLET | Refills: 1 | Status: SHIPPED | OUTPATIENT
Start: 2020-03-17 | End: 2020-08-27

## 2020-03-17 RX ORDER — LEVOTHYROXINE SODIUM 0.1 MG/1
100 TABLET ORAL DAILY
Qty: 90 TABLET | Refills: 1 | Status: SHIPPED | OUTPATIENT
Start: 2020-03-17 | End: 2020-04-01

## 2020-03-17 RX ORDER — PROPRANOLOL HCL 60 MG
60 CAPSULE, EXTENDED RELEASE 24HR ORAL DAILY
Qty: 90 CAPSULE | Refills: 3 | Status: SHIPPED | OUTPATIENT
Start: 2020-03-17 | End: 2021-02-22

## 2020-03-17 RX ORDER — PROPRANOLOL HYDROCHLORIDE 160 MG/1
160 CAPSULE, EXTENDED RELEASE ORAL DAILY
Qty: 90 CAPSULE | Refills: 1 | Status: SHIPPED | OUTPATIENT
Start: 2020-03-17 | End: 2020-08-27

## 2020-03-24 ENCOUNTER — APPOINTMENT (OUTPATIENT)
Dept: SLEEP MEDICINE | Facility: HOSPITAL | Age: 60
End: 2020-03-24

## 2020-04-01 RX ORDER — LEVOTHYROXINE SODIUM 0.1 MG/1
TABLET ORAL
Qty: 90 TABLET | Refills: 3 | Status: SHIPPED | OUTPATIENT
Start: 2020-04-01 | End: 2021-07-13

## 2020-08-27 ENCOUNTER — TELEPHONE (OUTPATIENT)
Dept: INTERNAL MEDICINE | Facility: CLINIC | Age: 60
End: 2020-08-27

## 2020-08-27 RX ORDER — PROPRANOLOL HYDROCHLORIDE 160 MG/1
CAPSULE, EXTENDED RELEASE ORAL
Qty: 90 CAPSULE | Refills: 3 | Status: SHIPPED | OUTPATIENT
Start: 2020-08-27

## 2020-08-27 RX ORDER — BROMOCRIPTINE MESYLATE 2.5 MG/1
TABLET ORAL
Qty: 360 TABLET | Refills: 3 | Status: SHIPPED | OUTPATIENT
Start: 2020-08-27 | End: 2021-09-22

## 2020-08-27 NOTE — TELEPHONE ENCOUNTER
Patient returning call to Nat regarding medication dosage.    She is currently taking 160 mg Inderol x1 daily    Dr. Sanchez had increased to 200 mg she felt no difference and returned to 160  Mg    Please submit refill to [facility]     Patient call back # [phone]

## 2020-09-08 ENCOUNTER — TELEPHONE (OUTPATIENT)
Dept: INTERNAL MEDICINE | Facility: CLINIC | Age: 60
End: 2020-09-08

## 2020-09-08 RX ORDER — SIMVASTATIN 40 MG
40 TABLET ORAL DAILY
Qty: 90 TABLET | Refills: 1 | Status: SHIPPED | OUTPATIENT
Start: 2020-09-08 | End: 2021-02-01

## 2020-09-11 ENCOUNTER — LAB (OUTPATIENT)
Dept: LAB | Facility: HOSPITAL | Age: 60
End: 2020-09-11

## 2020-09-11 ENCOUNTER — TRANSCRIBE ORDERS (OUTPATIENT)
Dept: ADMINISTRATIVE | Facility: HOSPITAL | Age: 60
End: 2020-09-11

## 2020-09-11 DIAGNOSIS — Z79.899 DRUG THERAPY: Primary | ICD-10-CM

## 2020-09-11 DIAGNOSIS — F31.71 BIPOLAR DISORDER, IN PARTIAL REMISSION, MOST RECENT EPISODE HYPOMANIC (HCC): ICD-10-CM

## 2020-09-11 DIAGNOSIS — Z79.899 DRUG THERAPY: ICD-10-CM

## 2020-09-11 LAB
ALBUMIN SERPL-MCNC: 4.2 G/DL (ref 3.5–5.2)
ALBUMIN/GLOB SERPL: 1.5 G/DL
ALP SERPL-CCNC: 55 U/L (ref 39–117)
ALT SERPL W P-5'-P-CCNC: 19 U/L (ref 1–33)
AMMONIA BLD-SCNC: 25 UMOL/L (ref 11–51)
ANION GAP SERPL CALCULATED.3IONS-SCNC: 8.7 MMOL/L (ref 5–15)
AST SERPL-CCNC: 12 U/L (ref 1–32)
BASOPHILS # BLD AUTO: 0.05 10*3/MM3 (ref 0–0.2)
BASOPHILS NFR BLD AUTO: 0.6 % (ref 0–1.5)
BILIRUB SERPL-MCNC: <0.2 MG/DL (ref 0–1.2)
BUN SERPL-MCNC: 15 MG/DL (ref 6–20)
BUN/CREAT SERPL: 11.5 (ref 7–25)
CALCIUM SPEC-SCNC: 10.5 MG/DL (ref 8.6–10.5)
CHLORIDE SERPL-SCNC: 107 MMOL/L (ref 98–107)
CO2 SERPL-SCNC: 25.3 MMOL/L (ref 22–29)
CREAT SERPL-MCNC: 1.31 MG/DL (ref 0.57–1)
DEPRECATED RDW RBC AUTO: 48.6 FL (ref 37–54)
EOSINOPHIL # BLD AUTO: 0.18 10*3/MM3 (ref 0–0.4)
EOSINOPHIL NFR BLD AUTO: 2 % (ref 0.3–6.2)
ERYTHROCYTE [DISTWIDTH] IN BLOOD BY AUTOMATED COUNT: 13.5 % (ref 12.3–15.4)
GFR SERPL CREATININE-BSD FRML MDRD: 42 ML/MIN/1.73
GLOBULIN UR ELPH-MCNC: 2.8 GM/DL
GLUCOSE SERPL-MCNC: 95 MG/DL (ref 65–99)
HCT VFR BLD AUTO: 37.7 % (ref 34–46.6)
HGB BLD-MCNC: 12.1 G/DL (ref 12–15.9)
IMM GRANULOCYTES # BLD AUTO: 0.03 10*3/MM3 (ref 0–0.05)
IMM GRANULOCYTES NFR BLD AUTO: 0.3 % (ref 0–0.5)
LITHIUM SERPL-SCNC: 1.5 MMOL/L (ref 0.6–1.2)
LYMPHOCYTES # BLD AUTO: 2.13 10*3/MM3 (ref 0.7–3.1)
LYMPHOCYTES NFR BLD AUTO: 23.6 % (ref 19.6–45.3)
MCH RBC QN AUTO: 31.3 PG (ref 26.6–33)
MCHC RBC AUTO-ENTMCNC: 32.1 G/DL (ref 31.5–35.7)
MCV RBC AUTO: 97.4 FL (ref 79–97)
MONOCYTES # BLD AUTO: 0.68 10*3/MM3 (ref 0.1–0.9)
MONOCYTES NFR BLD AUTO: 7.5 % (ref 5–12)
NEUTROPHILS NFR BLD AUTO: 5.94 10*3/MM3 (ref 1.7–7)
NEUTROPHILS NFR BLD AUTO: 66 % (ref 42.7–76)
NRBC BLD AUTO-RTO: 0 /100 WBC (ref 0–0.2)
PLATELET # BLD AUTO: 197 10*3/MM3 (ref 140–450)
PMV BLD AUTO: 10.6 FL (ref 6–12)
POTASSIUM SERPL-SCNC: 4.5 MMOL/L (ref 3.5–5.2)
PROT SERPL-MCNC: 7 G/DL (ref 6–8.5)
RBC # BLD AUTO: 3.87 10*6/MM3 (ref 3.77–5.28)
SODIUM SERPL-SCNC: 141 MMOL/L (ref 136–145)
T3 SERPL-MCNC: 85.8 NG/DL (ref 80–200)
T4 SERPL-MCNC: 5.47 MCG/DL (ref 4.5–11.7)
TSH SERPL DL<=0.05 MIU/L-ACNC: 9.82 UIU/ML (ref 0.27–4.2)
WBC # BLD AUTO: 9.01 10*3/MM3 (ref 3.4–10.8)

## 2020-09-11 PROCEDURE — 36415 COLL VENOUS BLD VENIPUNCTURE: CPT

## 2020-09-11 PROCEDURE — 84436 ASSAY OF TOTAL THYROXINE: CPT

## 2020-09-11 PROCEDURE — 82140 ASSAY OF AMMONIA: CPT

## 2020-09-11 PROCEDURE — 80165 DIPROPYLACETIC ACID FREE: CPT

## 2020-09-11 PROCEDURE — 80053 COMPREHEN METABOLIC PANEL: CPT

## 2020-09-11 PROCEDURE — 84443 ASSAY THYROID STIM HORMONE: CPT

## 2020-09-11 PROCEDURE — 84480 ASSAY TRIIODOTHYRONINE (T3): CPT

## 2020-09-11 PROCEDURE — 80178 ASSAY OF LITHIUM: CPT

## 2020-09-11 PROCEDURE — 85025 COMPLETE CBC W/AUTO DIFF WBC: CPT

## 2020-09-14 LAB — VALPROATE FREE SERPL-MCNC: 7.6 UG/ML (ref 6–22)

## 2021-02-01 RX ORDER — SIMVASTATIN 40 MG
TABLET ORAL
Qty: 90 TABLET | Refills: 0 | Status: SHIPPED | OUTPATIENT
Start: 2021-02-01 | End: 2021-05-03

## 2021-02-22 DIAGNOSIS — G25.0 HEREDITARY ESSENTIAL TREMOR: ICD-10-CM

## 2021-02-22 RX ORDER — PROPRANOLOL HCL 60 MG
CAPSULE, EXTENDED RELEASE 24HR ORAL
Qty: 90 CAPSULE | Refills: 3 | Status: SHIPPED | OUTPATIENT
Start: 2021-02-22 | End: 2022-02-17

## 2021-03-26 ENCOUNTER — BULK ORDERING (OUTPATIENT)
Dept: CASE MANAGEMENT | Facility: OTHER | Age: 61
End: 2021-03-26

## 2021-03-26 DIAGNOSIS — Z23 IMMUNIZATION DUE: ICD-10-CM

## 2021-03-31 ENCOUNTER — IMMUNIZATION (OUTPATIENT)
Dept: VACCINE CLINIC | Facility: HOSPITAL | Age: 61
End: 2021-03-31

## 2021-03-31 PROCEDURE — 0001A: CPT | Performed by: OBSTETRICS & GYNECOLOGY

## 2021-03-31 PROCEDURE — 91300 HC SARSCOV02 VAC 30MCG/0.3ML IM: CPT | Performed by: OBSTETRICS & GYNECOLOGY

## 2021-04-21 ENCOUNTER — IMMUNIZATION (OUTPATIENT)
Dept: VACCINE CLINIC | Facility: HOSPITAL | Age: 61
End: 2021-04-21

## 2021-04-21 PROCEDURE — 0002A: CPT | Performed by: OBSTETRICS & GYNECOLOGY

## 2021-04-21 PROCEDURE — 91300 HC SARSCOV02 VAC 30MCG/0.3ML IM: CPT | Performed by: OBSTETRICS & GYNECOLOGY

## 2021-05-03 RX ORDER — SIMVASTATIN 40 MG
TABLET ORAL
Qty: 90 TABLET | Refills: 3 | Status: SHIPPED | OUTPATIENT
Start: 2021-05-03 | End: 2022-05-02 | Stop reason: SDUPTHER

## 2021-05-06 ENCOUNTER — TRANSCRIBE ORDERS (OUTPATIENT)
Dept: ADMINISTRATIVE | Facility: HOSPITAL | Age: 61
End: 2021-05-06

## 2021-05-06 ENCOUNTER — LAB (OUTPATIENT)
Dept: LAB | Facility: HOSPITAL | Age: 61
End: 2021-05-06

## 2021-05-06 DIAGNOSIS — Z79.899 ENCOUNTER FOR LONG-TERM (CURRENT) USE OF OTHER MEDICATIONS: Primary | ICD-10-CM

## 2021-05-06 DIAGNOSIS — Z79.899 ENCOUNTER FOR LONG-TERM (CURRENT) USE OF OTHER MEDICATIONS: ICD-10-CM

## 2021-05-06 LAB
ALBUMIN SERPL-MCNC: 4 G/DL (ref 3.5–5.2)
ALBUMIN/GLOB SERPL: 1.5 G/DL
ALP SERPL-CCNC: 57 U/L (ref 39–117)
ALT SERPL W P-5'-P-CCNC: 21 U/L (ref 1–33)
AMMONIA BLD-SCNC: 26 UMOL/L (ref 11–51)
ANION GAP SERPL CALCULATED.3IONS-SCNC: 8.4 MMOL/L (ref 5–15)
AST SERPL-CCNC: 14 U/L (ref 1–32)
BILIRUB SERPL-MCNC: 0.2 MG/DL (ref 0–1.2)
BUN SERPL-MCNC: 14 MG/DL (ref 6–20)
BUN/CREAT SERPL: 10.9 (ref 7–25)
CALCIUM SPEC-SCNC: 10 MG/DL (ref 8.6–10.5)
CHLORIDE SERPL-SCNC: 108 MMOL/L (ref 98–107)
CO2 SERPL-SCNC: 23.6 MMOL/L (ref 22–29)
CREAT SERPL-MCNC: 1.28 MG/DL (ref 0.57–1)
GFR SERPL CREATININE-BSD FRML MDRD: 43 ML/MIN/1.73
GLOBULIN UR ELPH-MCNC: 2.6 GM/DL
GLUCOSE SERPL-MCNC: 94 MG/DL (ref 65–99)
POTASSIUM SERPL-SCNC: 4.8 MMOL/L (ref 3.5–5.2)
PROT SERPL-MCNC: 6.6 G/DL (ref 6–8.5)
SODIUM SERPL-SCNC: 140 MMOL/L (ref 136–145)
T-UPTAKE NFR SERPL: 1.07 TBI (ref 0.8–1.3)
T4 SERPL-MCNC: 5.73 MCG/DL (ref 4.5–11.7)
TSH SERPL DL<=0.05 MIU/L-ACNC: 3.21 UIU/ML (ref 0.27–4.2)

## 2021-05-06 PROCEDURE — 84479 ASSAY OF THYROID (T3 OR T4): CPT

## 2021-05-06 PROCEDURE — 82140 ASSAY OF AMMONIA: CPT

## 2021-05-06 PROCEDURE — 84443 ASSAY THYROID STIM HORMONE: CPT

## 2021-05-06 PROCEDURE — 36415 COLL VENOUS BLD VENIPUNCTURE: CPT

## 2021-05-06 PROCEDURE — 80053 COMPREHEN METABOLIC PANEL: CPT

## 2021-05-06 PROCEDURE — 84436 ASSAY OF TOTAL THYROXINE: CPT

## 2021-05-24 DIAGNOSIS — E03.9 HYPOTHYROIDISM, UNSPECIFIED TYPE: ICD-10-CM

## 2021-05-24 DIAGNOSIS — E78.2 MIXED HYPERLIPIDEMIA: ICD-10-CM

## 2021-05-24 DIAGNOSIS — E22.1 HYPERPROLACTINEMIA (HCC): ICD-10-CM

## 2021-05-24 DIAGNOSIS — Z00.00 HEALTHCARE MAINTENANCE: Primary | ICD-10-CM

## 2021-06-06 LAB
ALBUMIN SERPL-MCNC: 5.7 G/DL (ref 3.5–5.2)
ALBUMIN/GLOB SERPL: 6.3 G/DL
ALP SERPL-CCNC: 57 U/L (ref 39–117)
ALT SERPL-CCNC: 17 U/L (ref 1–33)
APPEARANCE UR: CLEAR
AST SERPL-CCNC: 12 U/L (ref 1–32)
BACTERIA #/AREA URNS HPF: NORMAL /HPF
BACTERIA UR CULT: NORMAL
BACTERIA UR CULT: NORMAL
BASOPHILS # BLD AUTO: 0.06 10*3/MM3 (ref 0–0.2)
BASOPHILS NFR BLD AUTO: 0.7 % (ref 0–1.5)
BILIRUB SERPL-MCNC: 0.3 MG/DL (ref 0–1.2)
BILIRUB UR QL STRIP: NEGATIVE
BUN SERPL-MCNC: 16 MG/DL (ref 6–20)
BUN/CREAT SERPL: 13.2 (ref 7–25)
CALCIUM SERPL-MCNC: 10.5 MG/DL (ref 8.6–10.5)
CASTS URNS QL MICRO: NORMAL /LPF
CHLORIDE SERPL-SCNC: 106 MMOL/L (ref 98–107)
CHOLEST SERPL-MCNC: 198 MG/DL (ref 0–200)
CO2 SERPL-SCNC: 28.1 MMOL/L (ref 22–29)
COLOR UR: YELLOW
CREAT SERPL-MCNC: 1.21 MG/DL (ref 0.57–1)
EOSINOPHIL # BLD AUTO: 0.27 10*3/MM3 (ref 0–0.4)
EOSINOPHIL NFR BLD AUTO: 3.2 % (ref 0.3–6.2)
EPI CELLS #/AREA URNS HPF: NORMAL /HPF (ref 0–10)
ERYTHROCYTE [DISTWIDTH] IN BLOOD BY AUTOMATED COUNT: 13.3 % (ref 12.3–15.4)
GLOBULIN SER CALC-MCNC: 0.9 GM/DL
GLUCOSE SERPL-MCNC: 91 MG/DL (ref 65–99)
GLUCOSE UR QL: NEGATIVE
HCT VFR BLD AUTO: 37.7 % (ref 34–46.6)
HDLC SERPL-MCNC: 66 MG/DL (ref 40–60)
HGB BLD-MCNC: 12.3 G/DL (ref 12–15.9)
HGB UR QL STRIP: NEGATIVE
IMM GRANULOCYTES # BLD AUTO: 0.03 10*3/MM3 (ref 0–0.05)
IMM GRANULOCYTES NFR BLD AUTO: 0.4 % (ref 0–0.5)
KETONES UR QL STRIP: NEGATIVE
LDLC SERPL CALC-MCNC: 98 MG/DL (ref 0–100)
LEUKOCYTE ESTERASE UR QL STRIP: ABNORMAL
LYMPHOCYTES # BLD AUTO: 2.64 10*3/MM3 (ref 0.7–3.1)
LYMPHOCYTES NFR BLD AUTO: 31.7 % (ref 19.6–45.3)
MCH RBC QN AUTO: 30.8 PG (ref 26.6–33)
MCHC RBC AUTO-ENTMCNC: 32.6 G/DL (ref 31.5–35.7)
MCV RBC AUTO: 94.5 FL (ref 79–97)
MICRO URNS: ABNORMAL
MONOCYTES # BLD AUTO: 0.72 10*3/MM3 (ref 0.1–0.9)
MONOCYTES NFR BLD AUTO: 8.6 % (ref 5–12)
NEUTROPHILS # BLD AUTO: 4.61 10*3/MM3 (ref 1.7–7)
NEUTROPHILS NFR BLD AUTO: 55.4 % (ref 42.7–76)
NITRITE UR QL STRIP: NEGATIVE
NRBC BLD AUTO-RTO: 0 /100 WBC (ref 0–0.2)
PH UR STRIP: 7 [PH] (ref 5–7.5)
PLATELET # BLD AUTO: 224 10*3/MM3 (ref 140–450)
POTASSIUM SERPL-SCNC: 4.5 MMOL/L (ref 3.5–5.2)
PROT SERPL-MCNC: 6.6 G/DL (ref 6–8.5)
PROT UR QL STRIP: NEGATIVE
RBC # BLD AUTO: 3.99 10*6/MM3 (ref 3.77–5.28)
RBC #/AREA URNS HPF: NORMAL /HPF (ref 0–2)
SODIUM SERPL-SCNC: 142 MMOL/L (ref 136–145)
SP GR UR: 1.01 (ref 1–1.03)
T4 FREE SERPL-MCNC: 1.29 NG/DL (ref 0.93–1.7)
TRIGL SERPL-MCNC: 204 MG/DL (ref 0–150)
TSH SERPL DL<=0.005 MIU/L-ACNC: 5.9 UIU/ML (ref 0.27–4.2)
URINALYSIS REFLEX: ABNORMAL
UROBILINOGEN UR STRIP-MCNC: 0.2 MG/DL (ref 0.2–1)
VLDLC SERPL CALC-MCNC: 34 MG/DL (ref 5–40)
WBC # BLD AUTO: 8.33 10*3/MM3 (ref 3.4–10.8)
WBC #/AREA URNS HPF: NORMAL /HPF (ref 0–5)

## 2021-06-11 ENCOUNTER — OFFICE VISIT (OUTPATIENT)
Dept: INTERNAL MEDICINE | Facility: CLINIC | Age: 61
End: 2021-06-11

## 2021-06-11 VITALS
OXYGEN SATURATION: 97 % | HEIGHT: 69 IN | WEIGHT: 198.1 LBS | RESPIRATION RATE: 18 BRPM | DIASTOLIC BLOOD PRESSURE: 74 MMHG | TEMPERATURE: 97.1 F | HEART RATE: 68 BPM | SYSTOLIC BLOOD PRESSURE: 122 MMHG | BODY MASS INDEX: 29.34 KG/M2

## 2021-06-11 DIAGNOSIS — Z12.31 ENCOUNTER FOR SCREENING MAMMOGRAM FOR MALIGNANT NEOPLASM OF BREAST: ICD-10-CM

## 2021-06-11 DIAGNOSIS — R53.82 CHRONIC FATIGUE: ICD-10-CM

## 2021-06-11 DIAGNOSIS — E22.1 HYPERPROLACTINEMIA (HCC): ICD-10-CM

## 2021-06-11 DIAGNOSIS — G47.33 OBSTRUCTIVE SLEEP APNEA SYNDROME: ICD-10-CM

## 2021-06-11 DIAGNOSIS — F25.0 SCHIZOAFFECTIVE DISORDER, BIPOLAR TYPE (HCC): ICD-10-CM

## 2021-06-11 DIAGNOSIS — F31.9 BIPOLAR 1 DISORDER (HCC): ICD-10-CM

## 2021-06-11 DIAGNOSIS — E03.9 HYPOTHYROIDISM, UNSPECIFIED TYPE: ICD-10-CM

## 2021-06-11 DIAGNOSIS — Z00.00 HEALTHCARE MAINTENANCE: Primary | ICD-10-CM

## 2021-06-11 DIAGNOSIS — E78.2 MIXED HYPERLIPIDEMIA: ICD-10-CM

## 2021-06-11 DIAGNOSIS — Z01.419 WELL WOMAN EXAM WITH ROUTINE GYNECOLOGICAL EXAM: ICD-10-CM

## 2021-06-11 DIAGNOSIS — D12.6 ADENOMATOUS POLYP OF COLON, UNSPECIFIED PART OF COLON: ICD-10-CM

## 2021-06-11 DIAGNOSIS — Z23 NEED FOR TETANUS, DIPHTHERIA, AND ACELLULAR PERTUSSIS (TDAP) VACCINE: ICD-10-CM

## 2021-06-11 DIAGNOSIS — G25.0 HEREDITARY ESSENTIAL TREMOR: ICD-10-CM

## 2021-06-11 PROCEDURE — 1170F FXNL STATUS ASSESSED: CPT | Performed by: INTERNAL MEDICINE

## 2021-06-11 PROCEDURE — 1159F MED LIST DOCD IN RCRD: CPT | Performed by: INTERNAL MEDICINE

## 2021-06-11 PROCEDURE — G0439 PPPS, SUBSEQ VISIT: HCPCS | Performed by: INTERNAL MEDICINE

## 2021-06-11 PROCEDURE — 90471 IMMUNIZATION ADMIN: CPT | Performed by: INTERNAL MEDICINE

## 2021-06-11 PROCEDURE — 90715 TDAP VACCINE 7 YRS/> IM: CPT | Performed by: INTERNAL MEDICINE

## 2021-06-11 PROCEDURE — 99396 PREV VISIT EST AGE 40-64: CPT | Performed by: INTERNAL MEDICINE

## 2021-06-11 RX ORDER — BUPROPION HYDROCHLORIDE 150 MG/1
TABLET ORAL
COMMUNITY
Start: 2021-03-23

## 2021-06-11 NOTE — PROGRESS NOTES
"Subjective     Chief Complaint   Patient presents with   • Annual Exam     yearly wellness review labs        HPI:  Kisha Bourgeois is a 60 y.o. female RTC In yearly CPE/ AWV, review of medical issues:  Health has been 'pretty good'.     1. Fatigue - remains a consistent complain.  'Not good\".  No change over year. Sleep is not an issues.  Up to10 hours nightly. Did not see sleep med recently.   2. Hypothyroidism -TSH at goal on levothyroxine daily. Take on empty stomach daily.   3. Bipolar/ Schizoaffective D/O with prominent anxiety sx element - managed per psych on multidrug regimen. No new meds.  Moods 'has been kind of tough' after lost sister and brother is ill.  Handled stresses of COVID overall.  No manic events over year.     4. Essential Tremor - progressive despite high dose B-blocker. \"Seems like it is getting worse'.  Is on higher doses of B-blocker in last year.  Splitting dose has not made much difference. Never been on other meds for this. Feels like has some tremor into legs now. Saw neurology several years ago.   5. MUNIRA - intolerant to CPAP in past.  Saw Inspire add on TV and wonders about that.  Declines mouthpiece due to issues with gums and recession.   6. HLD - on statin. No issues.  7. Hyperprolactinemia - on bromocriptine.      8. HM - C-scope 2013 (1 polyp) --> 5 years, did not complete; Pap due, not sure of last, not longer seeing Gyn. No prior abnormal Pap's;  Mammo deferred last year after COVID.     The following portions of the patient's history were reviewed and updated as appropriate: allergies, current medications, past family history, past medical history, past social history, past surgical history and problem list.    Review of Systems   Constitutional: Negative for chills, fever, malaise/fatigue, weight gain and weight loss.   HENT: Negative for congestion, hearing loss, odynophagia and sore throat.    Eyes: Negative for discharge, double vision, pain and redness.        Last eye " "exam due, has appt; wears glasses and contacts     Cardiovascular: Negative for chest pain, dyspnea on exertion, irregular heartbeat, near-syncope, palpitations and syncope.   Respiratory: Positive for snoring. Negative for cough, shortness of breath and sleep disturbances due to breathing.    Endocrine: Negative for polydipsia, polyphagia and polyuria.   Hematologic/Lymphatic: Negative for bleeding problem. Does not bruise/bleed easily.   Skin: Negative for rash and suspicious lesions.   Musculoskeletal: Positive for back pain (variable pain L > R, 'time to time'). Negative for joint pain, joint swelling, muscle cramps, muscle weakness and myalgias.   Gastrointestinal: Negative for constipation, diarrhea, dysphagia, heartburn, nausea and vomiting.   Genitourinary: Negative for bladder incontinence, dysuria, frequency, hematuria, hesitancy and incomplete emptying.   Neurological: Positive for excessive daytime sleepiness (\"I cant nap\", feels like would like to do it), dizziness (\"Sometime\", med side effect) and tremors (progressive). Negative for headaches and light-headedness.   Psychiatric/Behavioral: Negative for depression. The patient is nervous/anxious (variable, 'depends on the situation'. ). The patient does not have insomnia.    Allergic/Immunologic: Negative for environmental allergies and persistent infections.       Patient Care Team:  Mark Sanchez MD as PCP - General  Mark Sanchez MD as PCP - Family Medicine    Recent Hospitalizations: No recent hospitalization(s).    Depression Screen:   PHQ-2/PHQ-9 Depression Screening 6/11/2021   Little interest or pleasure in doing things 0   Feeling down, depressed, or hopeless 0   Trouble falling or staying asleep, or sleeping too much -   Feeling tired or having little energy -   Poor appetite or overeating -   Feeling bad about yourself - or that you are a failure or have let yourself or your family down -   Trouble concentrating on things, such as reading " the newspaper or watching television -   Moving or speaking so slowly that other people could have noticed. Or the opposite - being so fidgety or restless that you have been moving around a lot more than usual -   Thoughts that you would be better off dead, or of hurting yourself in some way -   Total Score 0   If you checked off any problems, how difficult have these problems made it for you to do your work, take care of things at home, or get along with other people? -       Functional and Cognitive Screening:  Functional & Cognitive Status 3/13/2020   Do you have difficulty preparing food and eating? No   Do you have difficulty bathing yourself, getting dressed or grooming yourself? No   Do you have difficulty using the toilet? No   Do you have difficulty moving around from place to place? No   Do you have trouble with steps or getting out of a bed or a chair? No   Current Diet Limited Junk Food   Dental Exam Up to date   Eye Exam Up to date   Exercise (times per week) 0 times per week   Current Exercise Activities Include None   Do you need help using the phone?  No   Are you deaf or do you have serious difficulty hearing?  No   Do you need help with transportation? Yes   Do you need help shopping? No   Do you need help preparing meals?  No   Do you need help with housework?  No   Do you need help with laundry? No   Do you need help taking your medications? No   Do you need help managing money? No   Do you ever drive or ride in a car without wearing a seat belt? No   Have you felt unusual stress, anger or loneliness in the last month? No   Who do you live with? Spouse   If you need help, do you have trouble finding someone available to you? No   Have you been bothered in the last four weeks by sexual problems? No   Do you have difficulty concentrating, remembering or making decisions? No       Does the patient have evidence of cognitive impairment? no    Does not need ASA (and currently is not on it)    Vitals:  "   06/11/21 1345   BP: 122/74   BP Location: Left arm   Patient Position: Sitting   Cuff Size: Adult   Pulse: 68   Resp: 18   Temp: 97.1 °F (36.2 °C)   TempSrc: Temporal   SpO2: 97%   Weight: 89.9 kg (198 lb 1.6 oz)   Height: 175.3 cm (69\")   PainSc: 3  Comment: lt hip       Body mass index is 29.25 kg/m².    Visual Acuity:  No exam data present    Advanced Care Planning:  ACP discussion was held with the patient during this visit. Patient has an advance directive (not in EMR), copy requested.    Objective   Physical Exam  Vitals reviewed. Exam conducted with a chaperone present.   Constitutional:       General: She is not in acute distress.     Appearance: Normal appearance. She is well-developed. She is not ill-appearing or toxic-appearing.   HENT:      Head: Normocephalic and atraumatic.      Right Ear: Hearing, tympanic membrane, ear canal and external ear normal.      Left Ear: Hearing, tympanic membrane, ear canal and external ear normal.      Nose: Nose normal.      Mouth/Throat:      Dentition: Normal dentition.      Tongue: No lesions.      Palate: No mass.      Pharynx: Oropharynx is clear. No posterior oropharyngeal erythema.   Eyes:      General: Lids are normal. No scleral icterus.     Extraocular Movements: Extraocular movements intact.      Conjunctiva/sclera: Conjunctivae normal.   Neck:      Thyroid: No thyroid mass or thyromegaly.      Vascular: No carotid bruit.      Trachea: Trachea normal.   Cardiovascular:      Rate and Rhythm: Normal rate and regular rhythm.      Pulses:           Radial pulses are 2+ on the right side and 2+ on the left side.        Dorsalis pedis pulses are 2+ on the right side and 2+ on the left side.        Posterior tibial pulses are 2+ on the right side and 2+ on the left side.      Heart sounds: Normal heart sounds, S1 normal and S2 normal. No murmur heard.   No friction rub. No gallop.    Pulmonary:      Effort: Pulmonary effort is normal. No respiratory distress.      " Breath sounds: Normal breath sounds. No wheezing, rhonchi or rales.   Chest:      Chest wall: No mass.      Breasts:         Right: No inverted nipple, mass, nipple discharge or skin change.         Left: No inverted nipple, mass, nipple discharge or skin change.   Abdominal:      General: Bowel sounds are normal. There is no distension.      Palpations: Abdomen is soft. There is no mass.      Tenderness: There is no abdominal tenderness. There is no guarding or rebound.   Genitourinary:     Vagina: Normal.      Cervix: No cervical motion tenderness, discharge, friability or cervical bleeding.      Uterus: Fixed. Not tender.       Adnexa: Right adnexa normal and left adnexa normal.      Rectum: No external hemorrhoid.   Musculoskeletal:         General: Normal range of motion.      Cervical back: Full passive range of motion without pain, normal range of motion and neck supple.      Right lower leg: No edema.      Left lower leg: No edema.      Right foot: Normal range of motion. No deformity or bunion.      Left foot: Normal range of motion. No deformity or bunion.   Feet:      Right foot:      Skin integrity: No ulcer, blister or skin breakdown.      Left foot:      Skin integrity: No ulcer, blister or skin breakdown.   Lymphadenopathy:      Cervical: No cervical adenopathy.      Right cervical: No superficial, deep or posterior cervical adenopathy.     Left cervical: No superficial, deep or posterior cervical adenopathy.      Upper Body:      Right upper body: No supraclavicular, axillary or pectoral adenopathy.      Left upper body: No supraclavicular, axillary or pectoral adenopathy.      Lower Body: No right inguinal adenopathy. No left inguinal adenopathy.   Skin:     General: Skin is warm and dry.      Findings: No rash.   Neurological:      Mental Status: She is alert and oriented to person, place, and time.      Cranial Nerves: No cranial nerve deficit or facial asymmetry.      Sensory: No sensory deficit.       Motor: Tremor (high frequency, moderate amplitude rest and action tremor diffuse/ arms > legs. ) present. No weakness, atrophy or abnormal muscle tone.      Gait: Gait normal.      Deep Tendon Reflexes: Reflexes are normal and symmetric.      Reflex Scores:       Patellar reflexes are 2+ on the right side and 2+ on the left side.       Achilles reflexes are 2+ on the right side and 2+ on the left side.  Psychiatric:         Mood and Affect: Mood normal.         Behavior: Behavior normal.         Thought Content: Thought content normal.         Compared to one year ago, the patient feels physical health is the same.  Compared to one year ago, the patient feels mental health is the same.    Reviewed chart for potential of high risk medication in the elderly: yes  Reviewed chart for potential of harmful drug interactions in the elderly:yes    Identification of Risk Factors:  Risk factors include: Advance Directive Discussion  Breast Cancer/Mammogram Screening  Colon Cancer Screening  Immunizations Discussed/Encouraged (specific immunizations; Hepatitis A Vaccine/Series and Shingrix )  Obesity/Overweight .    Patient Self-Management and Personalized Health Advice  The patient has been provided with information about: diet, exercise, weight management and mental health concerns and preventive services including:   · Annual Wellness Visit (AWV)  · Colorectal Cancer Screening, Colonoscopy  · Screening Mammography   · Screening Pap Tests  · Screening Pelvic Examinations (Includes a clinical breast examination).    Discussed the patient's BMI with her. The BMI is above average; BMI management plan is completed.    Assessment/Plan   Diagnoses and all orders for this visit:    1. Healthcare maintenance (Primary)    2. Schizoaffective disorder, bipolar type (CMS/HCC)    3. Obstructive sleep apnea syndrome  -     Ambulatory Referral to Sleep Medicine    4. Hypothyroidism, unspecified type    5. Hyperprolactinemia  (CMS/HCC)    6. Mixed hyperlipidemia    7. Hereditary essential tremor  -     Ambulatory Referral to Sleep Medicine    8. Bipolar 1 disorder (CMS/HCC)    9. Adenomatous polyp of colon, unspecified part of colon    10. Encounter for screening mammogram for malignant neoplasm of breast  -     Mammo Screening Bilateral With CAD    11. Need for tetanus, diphtheria, and acellular pertussis (Tdap) vaccine  -     Tdap Vaccine Greater Than or Equal To 8yo IM    12. Well woman exam with routine gynecological exam  -     IGP, Rfx Aptima HPV ASCU    13. Chronic fatigue  -     Ambulatory Referral to Sleep Medicine            Diagnoses and all orders for this visit:    Healthcare maintenance    Schizoaffective disorder, bipolar type (CMS/HCC)    Obstructive sleep apnea syndrome  -     Ambulatory Referral to Sleep Medicine    Hypothyroidism, unspecified type    Hyperprolactinemia (CMS/HCC)    Mixed hyperlipidemia    Hereditary essential tremor  -     Ambulatory Referral to Sleep Medicine    Bipolar 1 disorder (CMS/HCC)    Adenomatous polyp of colon, unspecified part of colon    Encounter for screening mammogram for malignant neoplasm of breast  -     Mammo Screening Bilateral With CAD    Need for tetanus, diphtheria, and acellular pertussis (Tdap) vaccine  -     Tdap Vaccine Greater Than or Equal To 8yo IM    Well woman exam with routine gynecological exam  -     IGP, Rfx Aptima HPV ASCU    Chronic fatigue  -     Ambulatory Referral to Sleep Medicine    Other orders  -     buPROPion XL (WELLBUTRIN XL) 150 MG 24 hr tablet      Kisha Bourgeois is a 60 y.o. female RTC In yearly CPE/ AWV, review of medical issues:    1. Fatigue - remains a consistent complain. Complex issue as I feel is mutlifactorial including med side effects vs. Relative bradycarida B-blocker effect vs. Untreated MUNIRA.  I have urged pt to revisit the MUNIRA issue and consider tx options, pt continues to resist.   2. Hypothyroidism -TSH just above goal on levothyroxine  daily. Change to 100mcg levothyroxine daily with 1 1/2 pills on one day of week. Recheck TSH/ fT4 in 6 weeks.   3. Bipolar/ Schizoaffective D/O with prominent anxiety sx element - managed per psych on multidrug regimen. No manic events over year.   Overall stable.   4. Essential Tremor - progressive despite high dose B-blocker. Neuro eval in distant past. Reveiwed with pt again today Tx otherwise challenging as HR limits too much increase in B-blocker. Pt fatigue makes nearly all other options (Primidone, gabapentin, schedule Clonzepam) challenging.  In addition, Primidone has signficant interactions with Depakote and Trintellix requiring coordination with psych. Will place call to psych to discuss (pt thinks she completed release last year).  5. MUNIRA - intolerant to CPAP in past.  Inquires about Inspire options.  Hesitant about dental device. Agrees to see sleep med to readdress issue, referral placed.   6. HLD - LDL at goal on statin. LFT's OK.     7. Hyperprolactinemia - on bromocriptine.  Had stable f/u MRI in 2014 after likely false elevation of Prolactin by psych meds.    8. HM - labs d/w pt; Flu/ COVID - UTD; Tdap - today; Shingrix and Hep A at pharmacy; C-scope 2013 (1 polyp) --> 5 years, due and pt agrees to complete ppwk after past referral, counseled; Pap today, no prior abnormals;  Mammo due, breast exam OK;   Hep C Ab (-) 8/2017; add exercise; Preventative care plan provided to pt at end of visit    Return in about 1 year (around 6/11/2022) for Annual physical, Medicare Wellness.          Current Outpatient Medications:   •  ALPRAZolam (XANAX) 0.5 MG tablet, Take 0.5 mg by mouth 2 (Two) Times a Day As Needed for Anxiety., Disp: , Rfl:   •  bromocriptine (PARLODEL) 2.5 MG tablet, TAKE 4 TABLETS AT BEDTIME (Patient taking differently: Take 1.25 mg by mouth. 2 tabs po qam and 2 tabs po qpm), Disp: 360 tablet, Rfl: 3  •  buPROPion XL (WELLBUTRIN XL) 150 MG 24 hr tablet, , Disp: , Rfl:   •  divalproex  (DEPAKOTE) 250 MG 24 hr tablet, Take 250 mg by mouth Daily., Disp: , Rfl:   •  levothyroxine (SYNTHROID, LEVOTHROID) 100 MCG tablet, TAKE 1 TABLET DAILY, Disp: 90 tablet, Rfl: 3  •  lithium (ESKALITH) 450 MG CR tablet, Take  by mouth. Alternate with depakote, Disp: , Rfl:   •  propranolol LA (INDERAL LA) 160 MG 24 hr capsule, TAKE 1 CAPSULE DAILY, Disp: 90 capsule, Rfl: 3  •  propranolol LA (INDERAL LA) 60 MG 24 hr capsule, TAKE 1 CAPSULE DAILY ALONG WITH 160 MG DOSE, Disp: 90 capsule, Rfl: 3  •  SAPHRIS 10 MG sublingual tablet sublingual tablet, , Disp: , Rfl:   •  simvastatin (ZOCOR) 40 MG tablet, TAKE 1 TABLET DAILY AS DIRECTED, Disp: 90 tablet, Rfl: 3  •  Vortioxetine HBr (TRINTELLIX) 10 MG tablet, Take  by mouth Daily., Disp: , Rfl:

## 2021-06-11 NOTE — PATIENT INSTRUCTIONS
Shingrix (new shingles shot; 2 shot series) check at pharmacy  Hepatitis A (2 shot series) check at pharmacy      Medicare Wellness  Personal Prevention Plan of Service     Date of Office Visit:  2021  Encounter Provider:  Mark Sanchez MD  Place of Service:  [facility]  Patient Name: Kisha Bourgeois  :  1960    As part of the Medicare Wellness portion of your visit today, we are providing you with this personalized preventive plan of services (PPPS). This plan is based upon recommendations of the [place] Preventive Services Task Force (USPSTF) and the Advisory Committee on Immunization Practices (ACIP).    This lists the preventive care services that should be considered, and provides dates of when you are due. Items listed as completed are up-to-date and do not require any further intervention.    Health Maintenance   Topic Date Due   • ZOSTER VACCINE (1 of 2) Never done   • MAMMOGRAM  2016   • COLORECTAL CANCER SCREENING  2018   • PAP SMEAR  2021 (Originally 2016)   • INFLUENZA VACCINE  2021   • LIPID PANEL  2022   • ANNUAL WELLNESS VISIT  2022   • TDAP/TD VACCINES (3 - Td or Tdap) 2031   • HEPATITIS C SCREENING  Completed   • COVID-19 Vaccine  Completed   • Pneumococcal Vaccine 0-64  Aged Out       Orders Placed This Encounter   Procedures   • Mammo Screening Bilateral With CAD     Order Specific Question:   Reason for Exam:     Answer:   yearly screening     Order Specific Question:   Release to patient     Answer:   Immediate   • Tdap Vaccine Greater Than or Equal To 6yo IM       Return in about 1 year (around 2022) for Annual physical, Medicare Wellness.         98.6

## 2021-06-15 LAB
CONV .: NORMAL
CYTOLOGIST CVX/VAG CYTO: NORMAL
CYTOLOGY CVX/VAG DOC CYTO: NORMAL
CYTOLOGY CVX/VAG DOC THIN PREP: NORMAL
DX ICD CODE: NORMAL
HIV 1 & 2 AB SER-IMP: NORMAL
OTHER STN SPEC: NORMAL
STAT OF ADQ CVX/VAG CYTO-IMP: NORMAL

## 2021-06-22 ENCOUNTER — APPOINTMENT (OUTPATIENT)
Dept: SLEEP MEDICINE | Facility: HOSPITAL | Age: 61
End: 2021-06-22

## 2021-07-02 ENCOUNTER — PREP FOR SURGERY (OUTPATIENT)
Dept: OTHER | Facility: HOSPITAL | Age: 61
End: 2021-07-02

## 2021-07-02 DIAGNOSIS — Z12.11 SCREEN FOR COLON CANCER: Primary | ICD-10-CM

## 2021-07-13 RX ORDER — LEVOTHYROXINE SODIUM 0.1 MG/1
TABLET ORAL
Qty: 90 TABLET | Refills: 3 | Status: SHIPPED | OUTPATIENT
Start: 2021-07-13 | End: 2022-05-02 | Stop reason: SDUPTHER

## 2021-07-15 ENCOUNTER — APPOINTMENT (OUTPATIENT)
Dept: SLEEP MEDICINE | Facility: HOSPITAL | Age: 61
End: 2021-07-15

## 2021-09-07 ENCOUNTER — OFFICE VISIT (OUTPATIENT)
Dept: SLEEP MEDICINE | Facility: HOSPITAL | Age: 61
End: 2021-09-07

## 2021-09-07 VITALS
HEIGHT: 69 IN | OXYGEN SATURATION: 96 % | WEIGHT: 199 LBS | HEART RATE: 63 BPM | SYSTOLIC BLOOD PRESSURE: 128 MMHG | BODY MASS INDEX: 29.47 KG/M2 | DIASTOLIC BLOOD PRESSURE: 73 MMHG

## 2021-09-07 DIAGNOSIS — G47.33 OSA (OBSTRUCTIVE SLEEP APNEA): Primary | ICD-10-CM

## 2021-09-07 NOTE — PROGRESS NOTES
[facility]      Patient Care Team:  Mark Sanchez MD as PCP - General  Mark Sanchez MD as PCP - Family Medicine    Referring Provider: Mark Sanchez MD    Chief complaint:   Snoring    History of present illness:  Subjective   This is a 60-year-old female patient with history of bipolar disorder and mild sleep apnea diagnosed in .    She was previously seen in our clinic.  She underwent polysomnography on 2013 which showed SC 79% with AHI 5 events/hour (4%) and RDI 12 events/H.  She was treated with CPAP which she used for few months but could not tolerate it.  She recalls that she use the nasal pillow.  She could not tell me exactly what was the issue but she said the whole experience was not comfortable.    She continues to complain about loud snoring, witnessed apnea and gasping for breath which can last for an hour every night according to her .  Snoring does not awaken her but disturb her .    She also reported sleeping for long hours.  She denies excessive daytime sleepiness but reported significant fatigue and lack of energy.    Sleep schedule:  -Bedtime: 2-3 AM  -Sleep latency: 15-30 min  -Wake up time: Noon-1 PM , does not feel refreshed  -Nocturnal awakenin-2 times because of nocturia.  No difficulties going back to sleep.  -Perceived sleep hours: 9-10      ESS: Total score: 3     Review of Systems  Constitutional: No fever or chills. No changes in appetite.   ENMT: No sinus congestion, postnasal drip, hoarsness  Cardiovascular: No chest pain, palpitation or legs swelling.    Respiratory: No dyspnea, cough or wheezing.  Gastrointestinal: No constipation, diarrhea, abdominal pain or acid reflux  Neurology: No headache, weakness, numbness or dizziness.   Musculoskeletal: No joints pain, stiffness or swelling.   Psychiatry: Dizziness, anxiety and depression.  Hem/Lymphatic: No swollen glands or easy  bruising.  Integumentary: No rash.  Endocrinology: No excessive thirst, cold or warm intolerance.   Urinary: No dysuria, bloody urine or frequent urination.     History  Past Medical History:   Diagnosis Date   • Abnormal electrocardiogram     chronic s/p eval with Cards in 2013   • Benign familial tremor    • Bipolar I disorder, single manic episode (CMS/HCC)     sees Dr. Betts   • Dizziness    • Dyspnea on exertion    • Ganglion cyst of flexor tendon sheath    • Hyperlipidemia    • Hyperprolactinemia (CMS/HCC)    • Hypothyroidism    • Macrocytosis    • Obstructive sleep apnea     mild dx'd 2013   • Prolactinoma (CMS/HCC)     microadenoma, s/p excision (Dr. Whyte) 2001 on bromocriptine   • Schizoaffective disorder (CMS/HCC)    • Tendonitis     R anterior superior iliac spine   • Vitamin D deficiency    ,   Past Surgical History:   Procedure Laterality Date   • NECK SURGERY      sebacous cyst excision   • PITUITARY SURGERY      adenomal removal (done by Dr. Whyte) 2001   ,   Family History   Problem Relation Age of Onset   • Aneurysm Mother    • Heart disease Mother    • Hypertension Mother    • Hypothyroidism Mother    • Osteoporosis Mother    • Heart disease Father    • Diabetes Father    • Hypertension Father    • Heart disease Sister         s/p CABG   • Prostate cancer Brother         metastatic   ,   Social History     Socioeconomic History   • Marital status:      Spouse name: Not on file   • Number of children: 0   • Years of education: Not on file   • Highest education level: Not on file   Tobacco Use   • Smoking status: Never Smoker   • Smokeless tobacco: Never Used   Vaping Use   • Vaping Use: Never used   Substance and Sexual Activity   • Alcohol use: Yes     Comment: 4-5 drinks/week   • Drug use: No   • Sexual activity: Yes     Partners: Male     Comment:  only     E-cigarette/Vaping   • E-cigarette/Vaping Use Never User    • Passive Exposure No    • Counseling Given No   "    E-cigarette/Vaping Substances   • Nicotine No    • THC No    • CBD No    • Flavoring No      E-cigarette/Vaping Devices   • Disposable No    • Pre-filled or Refillable Cartridge No    • Refillable Tank No    • Pre-filled Pod No         and Allergies:  Patient has no known allergies.    Medications:    Current Outpatient Medications:   •  ALPRAZolam (XANAX) 0.5 MG tablet, Take 0.5 mg by mouth 2 (Two) Times a Day As Needed for Anxiety., Disp: , Rfl:   •  bromocriptine (PARLODEL) 2.5 MG tablet, TAKE 4 TABLETS AT BEDTIME (Patient taking differently: Take 1.25 mg by mouth. 2 tabs po qam and 2 tabs po qpm), Disp: 360 tablet, Rfl: 3  •  buPROPion XL (WELLBUTRIN XL) 150 MG 24 hr tablet, , Disp: , Rfl:   •  divalproex (DEPAKOTE) 250 MG 24 hr tablet, Take 250 mg by mouth Daily., Disp: , Rfl:   •  levothyroxine (SYNTHROID, LEVOTHROID) 100 MCG tablet, TAKE 1 TABLET DAILY, Disp: 90 tablet, Rfl: 3  •  lithium (ESKALITH) 450 MG CR tablet, Take  by mouth. Alternate with depakote, Disp: , Rfl:   •  propranolol LA (INDERAL LA) 160 MG 24 hr capsule, TAKE 1 CAPSULE DAILY, Disp: 90 capsule, Rfl: 3  •  propranolol LA (INDERAL LA) 60 MG 24 hr capsule, TAKE 1 CAPSULE DAILY ALONG WITH 160 MG DOSE, Disp: 90 capsule, Rfl: 3  •  SAPHRIS 10 MG sublingual tablet sublingual tablet, , Disp: , Rfl:   •  simvastatin (ZOCOR) 40 MG tablet, TAKE 1 TABLET DAILY AS DIRECTED, Disp: 90 tablet, Rfl: 3  •  Vortioxetine HBr (TRINTELLIX) 10 MG tablet, Take  by mouth Daily., Disp: , Rfl:       Objective   Vital Signs:  Vitals:    09/07/21 1500   BP: 128/73   Pulse: 63   SpO2: 96%   Weight: 90.3 kg (199 lb)   Height: 175.3 cm (69\")     Body mass index is 29.39 kg/m².  Neck Circumference: 16.5 inches     Physical Exam:  Neck Circumference: 16.5 inches     Constitutional: Not in acute distress.  Eyes: Injected conjunctiva, EOMI. pupils equal reactive to light.  ENMT: Daisha score 4. Mallampati score 4. No oral thrush. Tonsils grade 1. Narrow distance in " between the posterior pharyngeal pillars.  Scalloped tongue.  Neck: Large. No thyromegaly.  Trachea midline.  Heart: Regular rhythm and rate, no murmur  Lungs: Good and equal air entry bilaterally. No crackles or wheezing.  Nonlabored breathing.       Abdomen: Obese.  Soft.  No tenderness.  Positive bowel sounds.  Extremities: No cyanosis, clubbing or pitting edema.  Warm extremities and well-perfused.  Neuro: Conscious, alert, oriented x3.  Gait is normal.  Strength 5/5 in arms and legs.  Psych: Appropriate mood and affect.    Integumentary: No rash.  Normal skin turgor.  Lymphatic: No palpable cervical or supraclavicular lymph nodes.    Diagnostic data:  Labs 6/4/2021: TSH 5.9; bicarb 28; hemoglobin 12    Assessment   1. MUNIRA, mild in 2013 but probably worse now due to weight gain and aging  2. Overweight, BMI 29  3. Bipolar disorder  4. Excessive daytime fatigue      Plan:  Check HST. We discussed the pathophysiology of sleep apnea, testing and therapy which include CPAP and weight loss.  She is not interested in CPAP therapy again and would like [facility].  I explained the procedure briefly and the qualifications which include moderate to severe sleep apnea and the necessity of endoscopy to ensure that the airways are compatible.  I also emphasized that surgery is considered the last choice of therapy.  We could offer her mandibular advancement devices but we will address that after reevaluation with HST.    We discussed the pathophysiology of sleep apnea, testing and therapy which include CPAP and weight loss.  Patient is agreeable with CPAP therapy if needed.    Discussed the importance of sleep apnea therapy in the setting of mood disorder          Rayne Cervantes MD  09/07/21  15:53 EDT    This note was dictated utilizing Dragon dictation

## 2021-09-22 RX ORDER — BROMOCRIPTINE MESYLATE 2.5 MG/1
1.25 TABLET ORAL 2 TIMES DAILY
Qty: 180 TABLET | Refills: 2 | Status: SHIPPED | OUTPATIENT
Start: 2021-09-22 | End: 2022-05-02 | Stop reason: SDUPTHER

## 2021-09-24 ENCOUNTER — APPOINTMENT (OUTPATIENT)
Dept: SLEEP MEDICINE | Facility: HOSPITAL | Age: 61
End: 2021-09-24

## 2021-10-26 ENCOUNTER — APPOINTMENT (OUTPATIENT)
Dept: MAMMOGRAPHY | Facility: HOSPITAL | Age: 61
End: 2021-10-26

## 2021-11-04 ENCOUNTER — HOSPITAL ENCOUNTER (OUTPATIENT)
Dept: SLEEP MEDICINE | Facility: HOSPITAL | Age: 61
Discharge: HOME | End: 2021-11-04
Admitting: INTERNAL MEDICINE

## 2021-11-04 DIAGNOSIS — G47.33 OSA (OBSTRUCTIVE SLEEP APNEA): ICD-10-CM

## 2021-11-04 PROCEDURE — 95806 SLEEP STUDY UNATT&RESP EFFT: CPT

## 2021-11-30 ENCOUNTER — TELEPHONE (OUTPATIENT)
Dept: SLEEP MEDICINE | Facility: HOSPITAL | Age: 61
End: 2021-11-30

## 2021-11-30 NOTE — TELEPHONE ENCOUNTER
Spoke with patient about sleep study result, she had reviewed results on my chart. Does not wish to proceed with any treatment since she is not a candidate for [facility]. Will follow up as needed

## 2022-01-28 ENCOUNTER — APPOINTMENT (OUTPATIENT)
Dept: MAMMOGRAPHY | Facility: HOSPITAL | Age: 62
End: 2022-01-28

## 2022-02-17 DIAGNOSIS — G25.0 HEREDITARY ESSENTIAL TREMOR: ICD-10-CM

## 2022-02-17 RX ORDER — PROPRANOLOL HCL 60 MG
CAPSULE, EXTENDED RELEASE 24HR ORAL
Qty: 90 CAPSULE | Refills: 3 | Status: SHIPPED | OUTPATIENT
Start: 2022-02-17 | End: 2022-05-02 | Stop reason: SDUPTHER

## 2022-04-19 ENCOUNTER — HOSPITAL ENCOUNTER (OUTPATIENT)
Dept: MAMMOGRAPHY | Facility: HOSPITAL | Age: 62
Discharge: HOME | End: 2022-04-19
Admitting: INTERNAL MEDICINE

## 2022-04-19 PROCEDURE — 77067 SCR MAMMO BI INCL CAD: CPT

## 2022-04-19 PROCEDURE — 77063 BREAST TOMOSYNTHESIS BI: CPT

## 2022-05-02 DIAGNOSIS — G25.0 HEREDITARY ESSENTIAL TREMOR: ICD-10-CM

## 2022-05-02 RX ORDER — BROMOCRIPTINE MESYLATE 2.5 MG/1
1.25 TABLET ORAL 2 TIMES DAILY
Qty: 180 TABLET | Refills: 2 | Status: SHIPPED | OUTPATIENT
Start: 2022-05-02 | End: 2022-05-05 | Stop reason: SDUPTHER

## 2022-05-02 RX ORDER — PROPRANOLOL HCL 60 MG
CAPSULE, EXTENDED RELEASE 24HR ORAL
Qty: 90 CAPSULE | Refills: 3 | Status: SHIPPED | OUTPATIENT
Start: 2022-05-02

## 2022-05-02 RX ORDER — SIMVASTATIN 40 MG
40 TABLET ORAL DAILY
Qty: 90 TABLET | Refills: 3 | Status: SHIPPED | OUTPATIENT
Start: 2022-05-02 | End: 2023-03-30 | Stop reason: SDUPTHER

## 2022-05-02 RX ORDER — LEVOTHYROXINE SODIUM 0.1 MG/1
100 TABLET ORAL DAILY
Qty: 90 TABLET | Refills: 3 | Status: SHIPPED | OUTPATIENT
Start: 2022-05-02

## 2022-05-02 NOTE — TELEPHONE ENCOUNTER
Caller: Kisha Bourgeois    Relationship: Self    Best call back number: [phone]   Requested Prescriptions:   Requested Prescriptions     Pending Prescriptions Disp Refills   • bromocriptine (PARLODEL) 2.5 MG tablet 180 tablet 2     Sig: Take 0.5 tablets by mouth 2 (Two) Times a Day. 2 tabs po qam and 2 tabs po qpm   • simvastatin (ZOCOR) 40 MG tablet 90 tablet 3     Sig: Take 1 tablet by mouth Daily. as directed   • propranolol LA (INDERAL LA) 60 MG 24 hr capsule 90 capsule 3   • levothyroxine (SYNTHROID, LEVOTHROID) 100 MCG tablet 90 tablet 3     Sig: Take 1 tablet by mouth Daily.        Pharmacy where request should be sent: [facility] - [place], [place] - [address] AT PORTAL TO [facility] - [phone]  - [phone] FX     Additional details provided by patient: CHANGE OF PHARMACY.  WILL NEED ALL NEW PRESCRIPTIONS FOR 90 DAYS    Does the patient have less than a 3 day supply:  [] Yes  [x] No    Mark Warner   05/02/22 15:53 EDT

## 2022-05-05 RX ORDER — BROMOCRIPTINE MESYLATE 2.5 MG/1
1.25 TABLET ORAL 2 TIMES DAILY
Qty: 180 TABLET | Refills: 2 | Status: SHIPPED | OUTPATIENT
Start: 2022-05-05 | End: 2022-11-29 | Stop reason: SDUPTHER

## 2022-11-30 RX ORDER — BROMOCRIPTINE MESYLATE 2.5 MG/1
5 TABLET ORAL 2 TIMES DAILY
Qty: 180 TABLET | Refills: 2 | Status: SHIPPED | OUTPATIENT
Start: 2022-11-30

## 2023-01-26 ENCOUNTER — OFFICE VISIT (OUTPATIENT)
Dept: INTERNAL MEDICINE | Facility: CLINIC | Age: 63
End: 2023-01-26
Payer: COMMERCIAL

## 2023-01-26 VITALS
HEIGHT: 69 IN | WEIGHT: 189.8 LBS | DIASTOLIC BLOOD PRESSURE: 68 MMHG | BODY MASS INDEX: 28.11 KG/M2 | TEMPERATURE: 98.2 F | OXYGEN SATURATION: 98 % | HEART RATE: 70 BPM | SYSTOLIC BLOOD PRESSURE: 122 MMHG

## 2023-01-26 DIAGNOSIS — J06.9 VIRAL URI WITH COUGH: Primary | ICD-10-CM

## 2023-01-26 DIAGNOSIS — R06.02 SHORTNESS OF BREATH: ICD-10-CM

## 2023-01-26 DIAGNOSIS — R05.9 COUGH, UNSPECIFIED TYPE: ICD-10-CM

## 2023-01-26 LAB
EXPIRATION DATE: NORMAL
FLUAV AG UPPER RESP QL IA.RAPID: NOT DETECTED
FLUBV AG UPPER RESP QL IA.RAPID: NOT DETECTED
INTERNAL CONTROL: NORMAL
Lab: NORMAL
SARS-COV-2 AG UPPER RESP QL IA.RAPID: NOT DETECTED

## 2023-01-26 PROCEDURE — 99213 OFFICE O/P EST LOW 20 MIN: CPT | Performed by: STUDENT IN AN ORGANIZED HEALTH CARE EDUCATION/TRAINING PROGRAM

## 2023-01-26 PROCEDURE — 87428 SARSCOV & INF VIR A&B AG IA: CPT | Performed by: STUDENT IN AN ORGANIZED HEALTH CARE EDUCATION/TRAINING PROGRAM

## 2023-01-26 NOTE — PROGRESS NOTES
Godfrey Silverio M.D.  Internal Medicine  [facility]  [address], [address]  [place] [address]  [phone]      Chief Complaint  Cough, Fatigue, Nasal Congestion, Sore Throat, and Headache    SUBJECTIVE    History of Present Illness    Kisha Bourgeois is a 62 y.o. female with hypothyroidism, bipolar disorder, MUNIRA, hyperprolactinemia who presents to the office today as an established patient of Dr Mark Sanchez MD here today for an acute care visit.     Symptoms started Sunday with sore throat, runny nose, cough. This AM she had an earache and headache. She feels all over weak. She always feels lightheaded, No fevers. Her  was sick 3 weeks ago. She feels short of breath. She has no heart or lung issues. Her cough is dry. She is taking Raphael's day and nighttime.     Review of Systems    No Known Allergies     No outpatient medications have been marked as taking for the 1/26/23 encounter (Office Visit) with Godfrey Silverio MD.        Past Medical History:   Diagnosis Date   • Abnormal electrocardiogram     chronic s/p eval with Cards in 2013   • Benign familial tremor    • Bipolar I disorder, single manic episode (HCC)     sees Dr. Betts   • Dizziness    • Dyspnea on exertion    • Ganglion cyst of flexor tendon sheath    • Hyperlipidemia    • Hyperprolactinemia (HCC)    • Hypothyroidism    • Macrocytosis    • Obstructive sleep apnea     mild dx'd 2013   • Prolactinoma (HCC)     microadenoma, s/p excision (Dr. Whyte) 2001 on bromocriptine   • Schizoaffective disorder (HCC)    • Tendonitis     R anterior superior iliac spine   • Vitamin D deficiency      Past Surgical History:   Procedure Laterality Date   • NECK SURGERY      sebacous cyst excision   • PITUITARY SURGERY      adenomal removal (done by Dr. Whyte) 2001     Family History   Problem Relation Age of Onset   • Aneurysm Mother    • Heart disease Mother    • Hypertension Mother    • Hypothyroidism Mother    • Osteoporosis  "Mother    • Heart disease Father    • Diabetes Father    • Hypertension Father    • Heart disease Sister         s/p CABG   • Prostate cancer Brother         metastatic    reports that she has never smoked. She has never used smokeless tobacco. She reports current alcohol use. She reports that she does not use drugs.    OBJECTIVE    Vital Signs:   /68   Pulse 70   Temp 98.2 °F (36.8 °C)   Ht 175.3 cm (69.02\")   Wt 86.1 kg (189 lb 12.8 oz)   SpO2 98%   BMI 28.02 kg/m²     Physical Exam  Constitutional:       General: She is not in acute distress.     Appearance: Normal appearance. She is normal weight.   HENT:      Right Ear: Tympanic membrane normal.      Left Ear: Tympanic membrane normal.      Nose: No congestion.      Mouth/Throat:      Pharynx: Posterior oropharyngeal erythema present.   Cardiovascular:      Rate and Rhythm: Normal rate and regular rhythm.      Heart sounds: Normal heart sounds. No murmur heard.  Pulmonary:      Effort: Pulmonary effort is normal. No respiratory distress.      Breath sounds: Normal breath sounds.   Skin:     General: Skin is warm and dry.   Neurological:      Mental Status: She is alert.   Psychiatric:         Mood and Affect: Mood normal.         Behavior: Behavior normal.         Thought Content: Thought content normal.        Chest x-ray on my read does not show any acute process.    The following data was reviewed by: Godfrey Silverio MD on 01/26/2023:    No lab data from past year to review.            Data reviewed: Recent PCP notes.  Patient has not been seen in some time.       Patient negative for COVID and flu today.      ASSESSMENT & PLAN     Diagnoses and all orders for this visit:    1. Viral URI with cough (Primary)    2. Cough, unspecified type  -     POCT SARS-CoV-2 Antigen ERNIE + Flu    3. Shortness of breath  -     XR Chest PA & Lateral; Future    62-year-old with sleep apnea here for upper respiratory symptoms for 5 days.  Chest x-ray appears normal and " nasal swab negative for COVID and flu.  Discussed that this is most likely viral URI and would expect this to resolve on its own without any intervention.  Encourage patient to drink lots of fluid and try Mucinex for nasal congestion/cough.  I encouraged her to reach out to us if she has failure to improve in symptoms or new or worsening symptoms.  Patient voiced understanding.    Health Maintenance Due   Topic Date Due   • COLORECTAL CANCER SCREENING  12/12/2018   • LIPID PANEL  06/04/2022   • ANNUAL WELLNESS VISIT  06/11/2022   • ZOSTER VACCINE (2 of 2) 07/28/2022   • INFLUENZA VACCINE  08/01/2022        Follow Up  No follow-ups on file.    Patient/family had no further questions at this time and verbalized understanding of the plan discussed today.

## 2023-01-30 DIAGNOSIS — R06.02 SHORTNESS OF BREATH: ICD-10-CM

## 2023-02-21 ENCOUNTER — TELEPHONE (OUTPATIENT)
Dept: INTERNAL MEDICINE | Facility: CLINIC | Age: 63
End: 2023-02-21

## 2023-02-21 NOTE — TELEPHONE ENCOUNTER
Caller: Kisha Bourgeois    Relationship: Self    Best call back number:[phone]    What is the medical concern/diagnosis: OVER DUE FOR COLONOSCOPY    What specialty or service is being requested: COLONOSCOPY    What is the provider, practice or medical service name: [facility]    Any additional details: PATIENT STATES THAT SHE IS ABOUT 3 YEARS OVERDUE FOR A COLONOSCOPY AND IS WANTING TO GET NE SCHEDULED AT [facility]. REQUESTING CALLBACK FOR UPDATES.

## 2023-02-23 DIAGNOSIS — Z12.11 SCREEN FOR COLON CANCER: Primary | ICD-10-CM

## 2023-02-23 DIAGNOSIS — D12.6 ADENOMATOUS POLYP OF COLON, UNSPECIFIED PART OF COLON: ICD-10-CM

## 2023-03-30 RX ORDER — SIMVASTATIN 40 MG
40 TABLET ORAL DAILY
Qty: 90 TABLET | Refills: 3 | Status: SHIPPED | OUTPATIENT
Start: 2023-03-30

## 2023-03-30 NOTE — TELEPHONE ENCOUNTER
Caller: iKsha Bourgeois    Relationship: Self    Best call back number: [phone]    Requested Prescriptions:   Requested Prescriptions     Pending Prescriptions Disp Refills   • simvastatin (ZOCOR) 40 MG tablet 90 tablet 3     Sig: Take 1 tablet by mouth Daily. as directed        Pharmacy where request should be sent: [facility] - [place] - [address] AT [address] TO [facility] - [phone]  - [phone] FX     Last office visit with prescribing clinician: 6/11/2021   Last telemedicine visit with prescribing clinician: 4/25/2023   Next office visit with prescribing clinician: 5/2/2023       Does the patient have less than a 3 day supply:  [] Yes  [x] No    Would you like a call back once the refill request has been completed: [] Yes [x] No    If the office needs to give you a call back, can they leave a voicemail: [] Yes [x] No    Lyndon Parkinson Rep   03/30/23 13:13 EDT

## 2023-04-20 DIAGNOSIS — Z00.00 ANNUAL PHYSICAL EXAM: Primary | ICD-10-CM

## 2023-04-27 LAB
ALBUMIN SERPL-MCNC: 4.3 G/DL (ref 3.5–5.2)
ALBUMIN/CREAT UR: 19 MG/G CREAT (ref 0–29)
ALBUMIN/GLOB SERPL: 2 G/DL
ALP SERPL-CCNC: 52 U/L (ref 39–117)
ALT SERPL-CCNC: 23 U/L (ref 1–33)
APPEARANCE UR: ABNORMAL
AST SERPL-CCNC: 14 U/L (ref 1–32)
BACTERIA #/AREA URNS HPF: ABNORMAL /HPF
BACTERIA UR CULT: NORMAL
BACTERIA UR CULT: NORMAL
BASOPHILS # BLD AUTO: 0.05 10*3/MM3 (ref 0–0.2)
BASOPHILS NFR BLD AUTO: 0.6 % (ref 0–1.5)
BILIRUB SERPL-MCNC: 0.2 MG/DL (ref 0–1.2)
BILIRUB UR QL STRIP: NEGATIVE
BUN SERPL-MCNC: 22 MG/DL (ref 8–23)
BUN/CREAT SERPL: 13.8 (ref 7–25)
CALCIUM SERPL-MCNC: 10.7 MG/DL (ref 8.6–10.5)
CASTS URNS QL MICRO: ABNORMAL /LPF
CHLORIDE SERPL-SCNC: 110 MMOL/L (ref 98–107)
CHOLEST SERPL-MCNC: 207 MG/DL (ref 0–200)
CO2 SERPL-SCNC: 25.8 MMOL/L (ref 22–29)
COLOR UR: YELLOW
CREAT SERPL-MCNC: 1.6 MG/DL (ref 0.57–1)
CREAT UR-MCNC: 159.9 MG/DL
EGFRCR SERPLBLD CKD-EPI 2021: 36.8 ML/MIN/1.73
EOSINOPHIL # BLD AUTO: 0.19 10*3/MM3 (ref 0–0.4)
EOSINOPHIL NFR BLD AUTO: 2.5 % (ref 0.3–6.2)
EPI CELLS #/AREA URNS HPF: >10 /HPF (ref 0–10)
ERYTHROCYTE [DISTWIDTH] IN BLOOD BY AUTOMATED COUNT: 12.8 % (ref 12.3–15.4)
GLOBULIN SER CALC-MCNC: 2.2 GM/DL
GLUCOSE SERPL-MCNC: 102 MG/DL (ref 65–99)
GLUCOSE UR QL STRIP: NEGATIVE
HBA1C MFR BLD: 5.2 % (ref 4.8–5.6)
HCT VFR BLD AUTO: 37.8 % (ref 34–46.6)
HDLC SERPL-MCNC: 66 MG/DL (ref 40–60)
HGB BLD-MCNC: 12.6 G/DL (ref 12–15.9)
HGB UR QL STRIP: NEGATIVE
IMM GRANULOCYTES # BLD AUTO: 0.03 10*3/MM3 (ref 0–0.05)
IMM GRANULOCYTES NFR BLD AUTO: 0.4 % (ref 0–0.5)
KETONES UR QL STRIP: NEGATIVE
LDLC SERPL CALC-MCNC: 105 MG/DL (ref 0–100)
LEUKOCYTE ESTERASE UR QL STRIP: ABNORMAL
LYMPHOCYTES # BLD AUTO: 2.41 10*3/MM3 (ref 0.7–3.1)
LYMPHOCYTES NFR BLD AUTO: 31.2 % (ref 19.6–45.3)
MCH RBC QN AUTO: 31.7 PG (ref 26.6–33)
MCHC RBC AUTO-ENTMCNC: 33.3 G/DL (ref 31.5–35.7)
MCV RBC AUTO: 95 FL (ref 79–97)
MICRO URNS: ABNORMAL
MICROALBUMIN UR-MCNC: 29.7 UG/ML
MONOCYTES # BLD AUTO: 0.65 10*3/MM3 (ref 0.1–0.9)
MONOCYTES NFR BLD AUTO: 8.4 % (ref 5–12)
NEUTROPHILS # BLD AUTO: 4.4 10*3/MM3 (ref 1.7–7)
NEUTROPHILS NFR BLD AUTO: 56.9 % (ref 42.7–76)
NITRITE UR QL STRIP: NEGATIVE
NRBC BLD AUTO-RTO: 0 /100 WBC (ref 0–0.2)
PH UR STRIP: 6 [PH] (ref 5–7.5)
PLATELET # BLD AUTO: 218 10*3/MM3 (ref 140–450)
POTASSIUM SERPL-SCNC: 4.7 MMOL/L (ref 3.5–5.2)
PROT SERPL-MCNC: 6.5 G/DL (ref 6–8.5)
PROT UR QL STRIP: ABNORMAL
RBC # BLD AUTO: 3.98 10*6/MM3 (ref 3.77–5.28)
RBC #/AREA URNS HPF: ABNORMAL /HPF (ref 0–2)
SODIUM SERPL-SCNC: 144 MMOL/L (ref 136–145)
SP GR UR STRIP: 1.02 (ref 1–1.03)
TRIGL SERPL-MCNC: 212 MG/DL (ref 0–150)
TSH SERPL DL<=0.005 MIU/L-ACNC: 1.65 UIU/ML (ref 0.27–4.2)
URINALYSIS REFLEX: ABNORMAL
UROBILINOGEN UR STRIP-MCNC: 0.2 MG/DL (ref 0.2–1)
VLDLC SERPL CALC-MCNC: 36 MG/DL (ref 5–40)
WBC # BLD AUTO: 7.73 10*3/MM3 (ref 3.4–10.8)
WBC #/AREA URNS HPF: ABNORMAL /HPF (ref 0–5)

## 2023-05-02 ENCOUNTER — OFFICE VISIT (OUTPATIENT)
Dept: INTERNAL MEDICINE | Facility: CLINIC | Age: 63
End: 2023-05-02
Payer: COMMERCIAL

## 2023-05-02 VITALS
WEIGHT: 198.2 LBS | SYSTOLIC BLOOD PRESSURE: 112 MMHG | DIASTOLIC BLOOD PRESSURE: 68 MMHG | OXYGEN SATURATION: 99 % | HEART RATE: 75 BPM | BODY MASS INDEX: 29.36 KG/M2 | TEMPERATURE: 98.2 F | HEIGHT: 69 IN

## 2023-05-02 DIAGNOSIS — Z12.31 ENCOUNTER FOR SCREENING MAMMOGRAM FOR MALIGNANT NEOPLASM OF BREAST: ICD-10-CM

## 2023-05-02 DIAGNOSIS — Z12.11 SCREEN FOR COLON CANCER: ICD-10-CM

## 2023-05-02 DIAGNOSIS — E03.9 HYPOTHYROIDISM, UNSPECIFIED TYPE: ICD-10-CM

## 2023-05-02 DIAGNOSIS — D12.6 ADENOMATOUS POLYP OF COLON, UNSPECIFIED PART OF COLON: ICD-10-CM

## 2023-05-02 DIAGNOSIS — Z00.01 ENCOUNTER FOR GENERAL ADULT MEDICAL EXAMINATION WITH ABNORMAL FINDINGS: Primary | ICD-10-CM

## 2023-05-02 DIAGNOSIS — E22.1 HYPERPROLACTINEMIA: ICD-10-CM

## 2023-05-02 DIAGNOSIS — F25.0 SCHIZOAFFECTIVE DISORDER, BIPOLAR TYPE: ICD-10-CM

## 2023-05-02 DIAGNOSIS — F31.9 BIPOLAR 1 DISORDER: ICD-10-CM

## 2023-05-02 DIAGNOSIS — G47.33 OBSTRUCTIVE SLEEP APNEA SYNDROME: ICD-10-CM

## 2023-05-02 DIAGNOSIS — G25.0 HEREDITARY ESSENTIAL TREMOR: ICD-10-CM

## 2023-05-02 DIAGNOSIS — E78.2 MIXED HYPERLIPIDEMIA: ICD-10-CM

## 2023-05-02 DIAGNOSIS — N95.1 HOT FLASH, MENOPAUSAL: ICD-10-CM

## 2023-05-02 DIAGNOSIS — Z86.018 HISTORY OF PITUITARY ADENOMA: ICD-10-CM

## 2023-05-02 RX ORDER — TRIHEXYPHENIDYL HYDROCHLORIDE 2 MG/1
TABLET ORAL
COMMUNITY
Start: 2023-03-16

## 2023-05-02 RX ORDER — PROPRANOLOL HYDROCHLORIDE 20 MG/1
TABLET ORAL
COMMUNITY
Start: 2023-03-15 | End: 2023-05-02

## 2023-05-02 NOTE — PROGRESS NOTES
"Annual Exam (CPE. Review of Medical Issues)      HPI  Kisha Bourgeois is a 62 y.o. female RTC in yearly CPE, review of medical issues: Notes that had fainting event in 2022. Saw Neurology and noted 'pressure was all over the place'.  Taken off Inderal all together. Had appt there that was scheduled so kept that appt. No additional fainting.  But in past 3-4 years has had 'swimmy head' sx when turns head and will feel 'lightheaded'.  No longer drives due to sx and anxiety.  Denies vertigo but admits time fainted did have vertigo sx then.   Checking pressure at home. Getting / 60-70's.   Checks pressure after rest for a few minutes. Arm cuff, supported arm.   1. Hypothyroidism - on levothyroxine daily on empty stomach, takes with rest of bills in AM.  Is able to take earlier.   2. Bipolar/ Schizoaffective D/O with prominent anxiety sx element - managed per psych on multidrug regimen. No manic events noted recently. Is on a new med, cannot recall which one is new med.   3. Essential Tremor - progressive despite high dose B-blocker. Referred to movement d/o clinic at , appt in 6/2023.  No other meds tried with neuro.  Off B-blcoker is variable, 'good days and bad days'.   4. MUNIRA - intolerant to CPAP in past. Had repeat HST and told was not Inspire candidate.  \"Was not to the point where needed it'. Notes cannot dental device due to dental work and current mouthpiece. Did not want and refuses today to retry  CPAP.   5. HLD - on statin. No issues with med.  6. Hyperprolactinemia - on bromocriptine. Wonders if still needs to take med, surgery was 25 years ago. Med is very expensive, ~$350 a month now in copay.  Has always been expensive.   7. HM - C-scope 2013 (1 polyp) --> 5 years, due and pt has lost ppwk. Needs to get a referral again. Mammo due, has postcard to schedule.     Review of Systems   Constitutional: Positive for weight gain. Negative for chills, fever and malaise/fatigue.   HENT: Negative for " congestion, hearing loss, odynophagia and sore throat.    Eyes: Negative for discharge, double vision, pain and redness.        Last eye exam ~8/2022, 'no change'.      Cardiovascular: Positive for syncope (single event). Negative for chest pain, dyspnea on exertion, irregular heartbeat, leg swelling, near-syncope and palpitations.   Respiratory: Positive for snoring. Negative for cough and shortness of breath.    Endocrine: Negative for polydipsia, polyphagia and polyuria.   Hematologic/Lymphatic: Negative for bleeding problem. Does not bruise/bleed easily.   Skin: Negative for rash and suspicious lesions.   Musculoskeletal: Positive for arthritis (knees, 'a little'). Negative for joint pain, joint swelling, muscle cramps, muscle weakness and myalgias.   Gastrointestinal: Negative for constipation, diarrhea, dysphagia, heartburn, nausea and vomiting.   Genitourinary: Negative for bladder incontinence, dysuria, frequency, hematuria, hesitancy and incomplete emptying.        Last pap 6/2021.     Neurological: Positive for light-headedness and vertigo. Negative for dizziness, focal weakness and headaches.   Psychiatric/Behavioral: Negative for depression. The patient is nervous/anxious ('nervous', stable sx for long term). The patient does not have insomnia ('good, once I get to sleep').    Allergic/Immunologic: Negative for environmental allergies and persistent infections.       Problem List:    Patient Active Problem List   Diagnosis   • Hypothyroidism   • Abnormal electrocardiogram   • Hereditary essential tremor   • Bipolar 1 disorder   • Dizziness   • Gastric catarrh   • Hot flash, menopausal   • Hyperlipidemia   • Hyperprolactinemia   • Obstructive sleep apnea syndrome   • Schizoaffective disorder   • Adenomatous polyp of colon   • History of pituitary adenoma       Medical History:    Past Medical History:   Diagnosis Date   • Abnormal electrocardiogram     chronic s/p eval with Cards in 2013   • Benign  familial tremor    • Bipolar I disorder, single manic episode     sees Dr. Betts   • Dizziness    • Dyspnea on exertion    • Ganglion cyst of flexor tendon sheath    • Hyperlipidemia    • Hyperprolactinemia    • Hypothyroidism    • Macrocytosis    • Obstructive sleep apnea     mild dx'd 2013   • Prolactinoma     microadenoma, s/p excision (Dr. Whyte) 2001 on bromocriptine   • Schizoaffective disorder    • Tendonitis     R anterior superior iliac spine   • Vitamin D deficiency         Social History:    Social History     Socioeconomic History   • Marital status:    • Number of children: 0   Tobacco Use   • Smoking status: Never   • Smokeless tobacco: Never   Vaping Use   • Vaping Use: Never used   Substance and Sexual Activity   • Alcohol use: Yes     Comment: 4-5 drinks/week   • Drug use: No   • Sexual activity: Yes     Partners: Male     Comment:  only       Family History:   Family History   Problem Relation Age of Onset   • Aneurysm Mother    • Heart disease Mother    • Hypertension Mother    • Hypothyroidism Mother    • Osteoporosis Mother    • Heart disease Father    • Diabetes Father    • Hypertension Father    • Heart disease Sister         s/p CABG   • Prostate cancer Brother         metastatic       Surgical History:   Past Surgical History:   Procedure Laterality Date   • NECK SURGERY      sebacous cyst excision   • PITUITARY SURGERY      adenomal removal (done by Dr. Whyte) 2001         Current Outpatient Medications:   •  ALPRAZolam (XANAX) 0.5 MG tablet, Take 1 tablet by mouth 2 (Two) Times a Day As Needed for Anxiety., Disp: , Rfl:   •  bromocriptine (PARLODEL) 2.5 MG tablet, Take 2 tablets by mouth 2 (Two) Times a Day. 2 tabs po qam and 2 tabs po qpm, Disp: 180 tablet, Rfl: 2  •  buPROPion XL (WELLBUTRIN XL) 150 MG 24 hr tablet, , Disp: , Rfl:   •  divalproex (DEPAKOTE) 250 MG 24 hr tablet, Take 1 tablet by mouth Daily., Disp: , Rfl:   •  levothyroxine (SYNTHROID, LEVOTHROID) 100  MCG tablet, Take 1 tablet by mouth Daily., Disp: 90 tablet, Rfl: 3  •  lithium (ESKALITH) 450 MG CR tablet, Take  by mouth. Alternate with depakote, Disp: , Rfl:   •  SAPHRIS 10 MG sublingual tablet sublingual tablet, , Disp: , Rfl:   •  simvastatin (ZOCOR) 40 MG tablet, Take 1 tablet by mouth Daily. as directed, Disp: 90 tablet, Rfl: 3  •  trihexyphenidyl (ARTANE) 2 MG tablet, , Disp: , Rfl:   •  Vortioxetine HBr (TRINTELLIX) 10 MG tablet tablet, Take  by mouth Daily., Disp: , Rfl:     Vitals:    05/02/23 1501   BP: 112/68   Pulse: 75   Temp: 98.2 °F (36.8 °C)   SpO2: 99%     Body mass index is 29.25 kg/m².    Physical Exam  Vitals reviewed.   Constitutional:       General: She is not in acute distress.     Appearance: Normal appearance. She is well-developed. She is not ill-appearing or toxic-appearing.   HENT:      Head: Normocephalic and atraumatic.      Right Ear: Hearing, tympanic membrane, ear canal and external ear normal. There is no impacted cerumen.      Left Ear: Hearing, tympanic membrane, ear canal and external ear normal. There is no impacted cerumen.      Nose: Nose normal.      Mouth/Throat:      Mouth: Mucous membranes are moist.      Pharynx: Oropharynx is clear. Uvula midline. No oropharyngeal exudate.   Eyes:      General: Lids are normal. No scleral icterus.     Extraocular Movements: Extraocular movements intact.      Right eye: Normal extraocular motion and no nystagmus.      Left eye: Normal extraocular motion and no nystagmus.      Conjunctiva/sclera: Conjunctivae normal.      Pupils: Pupils are equal, round, and reactive to light.   Neck:      Thyroid: No thyroid mass or thyromegaly.      Vascular: No carotid bruit.      Trachea: Trachea normal.   Cardiovascular:      Rate and Rhythm: Normal rate and regular rhythm.      Pulses:           Radial pulses are 2+ on the right side and 2+ on the left side.        Dorsalis pedis pulses are 2+ on the right side and 2+ on the left side.         Posterior tibial pulses are 2+ on the right side and 2+ on the left side.      Heart sounds: Normal heart sounds, S1 normal and S2 normal. No murmur heard.    No friction rub. No gallop.   Pulmonary:      Effort: Pulmonary effort is normal. No respiratory distress.      Breath sounds: Normal breath sounds. No wheezing, rhonchi or rales.   Chest:      Chest wall: No mass.   Breasts:     Right: No inverted nipple, mass, nipple discharge or skin change.      Left: No inverted nipple, mass, nipple discharge or skin change.   Abdominal:      General: Bowel sounds are normal. There is no distension.      Palpations: Abdomen is soft. There is no mass.      Tenderness: There is no abdominal tenderness. There is no guarding or rebound.   Musculoskeletal:         General: Normal range of motion.      Cervical back: Full passive range of motion without pain, normal range of motion and neck supple.      Right lower leg: No edema.      Left lower leg: No edema.      Right foot: Normal range of motion. No deformity or bunion.      Left foot: Normal range of motion. No deformity or bunion.   Feet:      Right foot:      Skin integrity: No ulcer, blister or skin breakdown.      Left foot:      Skin integrity: No ulcer, blister or skin breakdown.   Lymphadenopathy:      Cervical: No cervical adenopathy.      Right cervical: No superficial, deep or posterior cervical adenopathy.     Left cervical: No superficial, deep or posterior cervical adenopathy.      Upper Body:      Right upper body: No supraclavicular, axillary or pectoral adenopathy.      Left upper body: No supraclavicular, axillary or pectoral adenopathy.      Lower Body: No right inguinal adenopathy. No left inguinal adenopathy.   Skin:     General: Skin is warm and dry.      Findings: No rash.   Neurological:      Mental Status: She is alert and oriented to person, place, and time.      Cranial Nerves: No cranial nerve deficit, dysarthria or facial asymmetry.       Sensory: No sensory deficit.      Motor: Tremor (moderate amplitude, moderate frequency action tremor B arms. ) present. No weakness, atrophy or abnormal muscle tone.      Gait: Gait normal.      Deep Tendon Reflexes: Reflexes are normal and symmetric.      Reflex Scores:       Patellar reflexes are 2+ on the right side and 2+ on the left side.       Achilles reflexes are 2+ on the right side and 2+ on the left side.     Comments: Rey Hallpike (-) B  Some rhythmic lip smacking movements during exam, subtle but persistent.   Psychiatric:         Attention and Perception: Attention normal.         Mood and Affect: Mood normal. Affect is flat (slightly ). Affect is not angry, tearful or inappropriate.         Speech: Speech normal.         Behavior: Behavior normal.         Thought Content: Thought content normal.         Cognition and Memory: Cognition normal.         Assessment/ Plan  Diagnoses and all orders for this visit:    Encounter for general adult medical examination with abnormal findings    Obstructive sleep apnea syndrome  -     Pneumococcal Conjugate Vaccine 20-Valent All    Hyperprolactinemia  -     Prolactin    Hypothyroidism, unspecified type    Hereditary essential tremor    Bipolar 1 disorder    Hot flash, menopausal    Mixed hyperlipidemia    Schizoaffective disorder, bipolar type    Adenomatous polyp of colon, unspecified part of colon    History of pituitary adenoma    Screen for colon cancer  -     Ambulatory Referral For Screening Colonoscopy    Encounter for screening mammogram for malignant neoplasm of breast  -     Mammo Screening Bilateral With CAD; Future    Other orders  -     trihexyphenidyl (ARTANE) 2 MG tablet  -     Discontinue: propranolol (INDERAL) 20 MG tablet        Return in about 1 year (around 5/2/2024) for Recheck.      Discussion:  Kisha Bourgeois is a 62 y.o. female RTC in yearly CPE, review of medical issues:   1. Fainting event in 2022 - s/p Neurology eval 3/2023 and seen in  neurology office, note reviewed. No associated cardiac sx. B-blocker tapered off. No recurrence of events and pressure OK in office today.  Some ? Element of vertigo but not inducible on exam today. Will monitor for now as pt feeling better. ? Med side effect element of dizziness at play, prominent side effect of trihexyphenidyl med. Pt to call if any recurrence of fainting.   2. Hypothyroidism - TSH variable over time, but at goal on levothyroxine daily on empty stomach. However, advised to change and take without other pills, first AM.  Trend TSH.  3. Bipolar/ Schizoaffective D/O with prominent anxiety sx element - managed per psych, Dr. Betts, on multidrug regimen. No manic events over year. Appears Artane is new med addition.Overall stable.   --> ? Mild tardive dyskinesia - noted on exam, potential Trintellix, Saphris and Artane contribution.  Has [facility]vment d/o clinic appt, defer to their eval.  4. Essential Tremor - progressive despite high dose B-blocker, now off after syncope.  ? Complicated by lithium and Depakote.  Referred to movement d/o clinic at , appt in 6/2023. Recall, challenging issue as pt fatigue makes nearly all other options (Primidone, gabapentin, schedule Clonzepam) challenging and Primidone has signficant interactions with Depakote and Trintellix requiring coordination with psych.   5. MUNIRA,  intolerant to CPAP -  re- eval in 2023 with positive study, but not Inspire candidate.  Declined additional CPAP and already on dental biteguard, so declines additional MAD option there. Pt refuses tx of dx at this time.   6. HLD - LDL at goal on statin. LFT's OK.    7. Hyperprolactinemia, s/p ptiuitary surgery ~200; stable f/u MRI in 2014 after likely false elevation of Prolactin by psych meds - desires to come off bromocriptine due to expense. Challengnig due to ability to get accurate prolactin baseline on psych med confounders. Check today.  If elevated will need consideration of MRI and  following from new baseline on trend.  Will f/u after prolactin check.   8. HM - labs d/w pt; Flu/ Tdap/ COVID - UTD; Prenvar - today; Shingrix and Hep A at pharmacy; C-scope 2013 (1 polyp) --> 5 years, due and pt agrees, refer again today; Pap (-) 6/2021 --> 3 years;  Mammo due, breast exam OK;   Hep C Ab (-) 8/2017; add exercise; Preventative care plan provided to pt at end of visit

## 2023-05-03 PROBLEM — Z86.018 HISTORY OF PITUITARY ADENOMA: Status: ACTIVE | Noted: 2023-05-03

## 2023-05-30 ENCOUNTER — HOSPITAL ENCOUNTER (OUTPATIENT)
Dept: MAMMOGRAPHY | Facility: HOSPITAL | Age: 63
Discharge: HOME | End: 2023-05-30
Admitting: INTERNAL MEDICINE

## 2023-05-30 DIAGNOSIS — Z12.31 ENCOUNTER FOR SCREENING MAMMOGRAM FOR MALIGNANT NEOPLASM OF BREAST: ICD-10-CM

## 2023-05-30 PROCEDURE — 77063 BREAST TOMOSYNTHESIS BI: CPT

## 2023-05-30 PROCEDURE — 77067 SCR MAMMO BI INCL CAD: CPT

## 2023-06-14 PROBLEM — Z12.11 SCREEN FOR COLON CANCER: Status: ACTIVE | Noted: 2023-06-14

## 2023-06-19 RX ORDER — BROMOCRIPTINE MESYLATE 2.5 MG/1
TABLET ORAL
Qty: 180 TABLET | Refills: 2 | Status: SHIPPED | OUTPATIENT
Start: 2023-06-19

## 2023-06-20 LAB — PROLACTIN SERPL-MCNC: 32 NG/ML (ref 4.8–23.3)

## 2023-08-15 RX ORDER — LEVOTHYROXINE SODIUM 100 MCG
TABLET ORAL
Qty: 90 TABLET | Refills: 3 | Status: SHIPPED | OUTPATIENT
Start: 2023-08-15

## 2023-10-10 RX ORDER — PRIMIDONE 50 MG/1
100 TABLET ORAL NIGHTLY
COMMUNITY

## 2023-10-11 ENCOUNTER — HOSPITAL ENCOUNTER (OUTPATIENT)
Facility: HOSPITAL | Age: 63
Setting detail: HOSPITAL OUTPATIENT SURGERY
Discharge: HOME | End: 2023-10-11
Attending: SURGERY | Admitting: SURGERY
Payer: COMMERCIAL

## 2023-10-11 ENCOUNTER — ANESTHESIA EVENT (OUTPATIENT)
Dept: GASTROENTEROLOGY | Facility: HOSPITAL | Age: 63
End: 2023-10-11
Payer: COMMERCIAL

## 2023-10-11 ENCOUNTER — ANESTHESIA (OUTPATIENT)
Dept: GASTROENTEROLOGY | Facility: HOSPITAL | Age: 63
End: 2023-10-11
Payer: COMMERCIAL

## 2023-10-11 VITALS
DIASTOLIC BLOOD PRESSURE: 79 MMHG | HEIGHT: 69 IN | WEIGHT: 177.2 LBS | BODY MASS INDEX: 26.25 KG/M2 | HEART RATE: 74 BPM | SYSTOLIC BLOOD PRESSURE: 123 MMHG | OXYGEN SATURATION: 97 % | RESPIRATION RATE: 16 BRPM

## 2023-10-11 PROCEDURE — 25810000003 LACTATED RINGERS PER 1000 ML: Performed by: SURGERY

## 2023-10-11 PROCEDURE — 25010000002 PROPOFOL 10 MG/ML EMULSION: Performed by: NURSE ANESTHETIST, CERTIFIED REGISTERED

## 2023-10-11 RX ORDER — LIDOCAINE HYDROCHLORIDE 20 MG/ML
INJECTION, SOLUTION INFILTRATION; PERINEURAL AS NEEDED
Status: DISCONTINUED | OUTPATIENT
Start: 2023-10-11 | End: 2023-10-11 | Stop reason: SURG

## 2023-10-11 RX ORDER — PROPOFOL 10 MG/ML
VIAL (ML) INTRAVENOUS CONTINUOUS PRN
Status: DISCONTINUED | OUTPATIENT
Start: 2023-10-11 | End: 2023-10-11 | Stop reason: SURG

## 2023-10-11 RX ORDER — SODIUM CHLORIDE, SODIUM LACTATE, POTASSIUM CHLORIDE, CALCIUM CHLORIDE 600; 310; 30; 20 MG/100ML; MG/100ML; MG/100ML; MG/100ML
30 INJECTION, SOLUTION INTRAVENOUS CONTINUOUS
Status: DISCONTINUED | OUTPATIENT
Start: 2023-10-11 | End: 2023-10-11 | Stop reason: HOSPADM

## 2023-10-11 RX ADMIN — PROPOFOL 180 MCG/KG/MIN: 10 INJECTION, EMULSION INTRAVENOUS at 07:27

## 2023-10-11 RX ADMIN — LIDOCAINE HYDROCHLORIDE 60 MG: 20 INJECTION, SOLUTION INFILTRATION; PERINEURAL at 07:27

## 2023-10-11 RX ADMIN — PROPOFOL 100 MG: 10 INJECTION, EMULSION INTRAVENOUS at 07:27

## 2023-10-11 RX ADMIN — SODIUM CHLORIDE, POTASSIUM CHLORIDE, SODIUM LACTATE AND CALCIUM CHLORIDE 30 ML/HR: 600; 310; 30; 20 INJECTION, SOLUTION INTRAVENOUS at 06:35

## 2023-10-11 NOTE — H&P
CC: Surveillance    HPI: 63-year-old lady with history of adenomatous polyps    PMH, PSH, MEDS AND ALLERGIES reviewed and reconciled in  EPIC    PHYSICAL EXAM:  Constitutional:  awake, alert, no acute distress  VS: afebrile, VSS  Respiratory:  normal inspiratory effort  Cardiovascular: regular rate  Gastrointestinal: Soft    ROS:  relevant systems negative other than any presenting complaints    ASSESSMENT:    Surveillance secondary to history of adenomatous polyps    PLAN:  Colonoscopy    Antonino Drake M.D.

## 2023-10-11 NOTE — ANESTHESIA POSTPROCEDURE EVALUATION
Patient: Kisha Bourgeois    Procedure Summary       Date: 10/11/23 Room / Location: [facility] ENDOSCOPY 1 /  [facility]    Anesthesia Start: 0718 Anesthesia Stop: 0754    Procedure: COLONOSCOPY TO CECUM Diagnosis:       Screen for colon cancer      (Screen for colon cancer [Z12.11])    Surgeons: Antonino Drake MD Provider: Danny Conn MD    Anesthesia Type: MAC ASA Status: 2            Anesthesia Type: MAC    Vitals  Vitals Value Taken Time   /66 10/11/23 0757   Temp     Pulse 66 10/11/23 0800   Resp 16 10/11/23 0757   SpO2 94 % 10/11/23 0800   Vitals shown include unfiled device data.        Post Anesthesia Care and Evaluation    Patient location during evaluation: PACU  Patient participation: complete - patient participated  Level of consciousness: awake and alert  Pain management: adequate    Airway patency: patent  Anesthetic complications: No anesthetic complications    Cardiovascular status: acceptable  Respiratory status: acceptable  Hydration status: acceptable    Comments: --------------------            10/11/23               0757     --------------------   BP:       119/66     Pulse:      67       Resp:       16       SpO2:      96%      --------------------

## 2023-10-11 NOTE — ANESTHESIA PREPROCEDURE EVALUATION
Anesthesia Evaluation     Patient summary reviewed and Nursing notes reviewed                Airway   Mallampati: II  TM distance: >3 FB  Neck ROM: full  Dental      Pulmonary - negative pulmonary ROS   (+) ,shortness of breath, sleep apnea  Cardiovascular - negative cardio ROS    Rhythm: regular  Rate: normal    (+) hyperlipidemia      Neuro/Psych- negative ROS  (+) dizziness/light headedness, syncope, tremors, psychiatric history Bipolar  GI/Hepatic/Renal/Endo - negative ROS   (+) thyroid problem hypothyroidism    Musculoskeletal (-) negative ROS    Abdominal    Substance History - negative use     OB/GYN negative ob/gyn ROS         Other                          Anesthesia Plan    ASA 2     MAC     intravenous induction     Anesthetic plan, risks, benefits, and alternatives have been provided, discussed and informed consent has been obtained with: patient.    Plan discussed with CRNA.        CODE STATUS:

## 2023-10-11 NOTE — DISCHARGE INSTRUCTIONS
For the next 24 hours patient needs to be with a responsible adult.    For 24 hours DO NOT drive, operate machinery, appliances, drink alcohol, make important decisions or sign legal documents.    Start with a light or bland diet if you are feeling sick to your stomach otherwise advance to regular diet as tolerated.    Follow recommendations on procedure report if provided by your doctor.    Call Dr Drake for problems [phone]    Problems may include but not limited to: large amounts of bleeding, trouble breathing, repeated vomiting, severe unrelieved pain, fever or chills.

## 2023-10-11 NOTE — OP NOTE
PREOPERATIVE DIAGNOSIS:  High risk screening secondary to personal history of adenomatous polyps    POSTOPERATIVE DIAGNOSIS AND FINDINGS:  Normal    PROCEDURE:  Colonoscopy to cecum     SURGEON:  Antonino Drake MD    ANESTHESIA:  MAC    SPECIMEN(S):  none    DESCRIPTION:  In decubitus position digital rectal exam was normal. Colonoscope inserted under direct visualization of lumen to cecum confirmed by visualization of ileocecal valve and appendiceal orifice.  Scope was slowly withdrawn circumferentially examining all mucosal surfaces.  Bowel preparation was good.  There were no mucosal abnormalities.  Tolerated well.    RECOMMENDATION FOR FUTURE SURVEILLANCE:  5 years    Antonino Drake M.D.

## 2023-10-13 ENCOUNTER — OFFICE VISIT (OUTPATIENT)
Dept: INTERNAL MEDICINE | Facility: CLINIC | Age: 63
End: 2023-10-13
Payer: COMMERCIAL

## 2023-10-13 VITALS
HEART RATE: 75 BPM | TEMPERATURE: 98.3 F | BODY MASS INDEX: 26.39 KG/M2 | DIASTOLIC BLOOD PRESSURE: 74 MMHG | HEIGHT: 69 IN | OXYGEN SATURATION: 97 % | SYSTOLIC BLOOD PRESSURE: 116 MMHG | WEIGHT: 178.2 LBS

## 2023-10-13 DIAGNOSIS — I95.1 ORTHOSTATIC HYPOTENSION: Primary | ICD-10-CM

## 2023-10-13 DIAGNOSIS — Z23 NEEDS FLU SHOT: ICD-10-CM

## 2023-10-13 DIAGNOSIS — R55 SYNCOPE, UNSPECIFIED SYNCOPE TYPE: ICD-10-CM

## 2023-10-13 DIAGNOSIS — Z79.899 LONG-TERM CURRENT USE OF LITHIUM: ICD-10-CM

## 2023-10-13 NOTE — PROGRESS NOTES
"Blood Pressure Check (Low Blood Pressure Concerns/2 fainting spells in the past 6 months (once in May and then September))      HPI  Kisha Bourgeois is a 63 y.o. female RTC in acute care:   \"I am having trouble with low blood pressure'.  Had 2 events at home where felt faint and 'everything started spinning and then next thing I knew I was on the floor'.   witnessed first event, out only a few seconds. Pt clear that did loose consciousness on both events. No post ictal state noted.  \"Knocked the wind out of me'. Recovered a few minutes. NO confusion. Did not check pressure at that time on first event.  Second time checked and got 110 range, 'like it is today'.    Started checking pressure at home after first event and got 100-110's when checked.  No numbers < 100.    Seeing neurologist for tremor and had orthostatics on 9/6/23.    Off depakote now as recent med changes. Off lamictal after short term use.  Lithium levels have been OK, changed to once daily dosing by psych.  Wants to get level drawn today.     Answers submitted by the patient for this visit:  Other (Submitted on 10/6/2023)  Please describe your symptoms.: Fainting. Low blood pressure  Have you had these symptoms before?: Yes  How long have you been having these symptoms?: Greater than 2 weeks  Primary Reason for Visit (Submitted on 10/6/2023)  What is the primary reason for your visit?: Other    Review of Systems   Constitutional: Negative for chills, fever and weight gain.   Cardiovascular:  Positive for near-syncope and syncope. Negative for chest pain, dyspnea on exertion, irregular heartbeat, leg swelling and palpitations.   Respiratory:  Negative for shortness of breath.    Neurological:  Positive for dizziness, light-headedness and tremors. Negative for sensory change and vertigo.   Psychiatric/Behavioral:  Negative for altered mental status.        The following portions of the patient's history were reviewed and updated as appropriate: " "allergies, current medications, past medical history, past social history, and problem list.      Current Outpatient Medications:     ALPRAZolam (XANAX) 0.5 MG tablet, Take 1 tablet by mouth 2 (Two) Times a Day As Needed for Anxiety., Disp: , Rfl:     bromocriptine (PARLODEL) 2.5 MG tablet, TAKE 2 TABLETS EVERY       MORNING AND TAKE 2 TABLETS EVERY EVENING, Disp: 180 tablet, Rfl: 2    buPROPion XL (WELLBUTRIN XL) 150 MG 24 hr tablet, 1 tablet Every Morning., Disp: , Rfl:     lithium (ESKALITH) 450 MG CR tablet, Take 1 tablet by mouth Every Night. Alternate with depakote, Disp: , Rfl:     primidone (MYSOLINE) 50 MG tablet, Take 2 tablets by mouth Every Night., Disp: , Rfl:     SAPHRIS 10 MG sublingual tablet sublingual tablet, 1 tablet Every Night., Disp: , Rfl:     simvastatin (ZOCOR) 40 MG tablet, TAKE 1 TABLET DAILY AS     DIRECTED, Disp: 90 tablet, Rfl: 2    Synthroid 100 MCG tablet, TAKE 1 TABLET DAILY, Disp: 90 tablet, Rfl: 3    Vortioxetine HBr (TRINTELLIX) 10 MG tablet tablet, Take 1 tablet by mouth Daily With Breakfast., Disp: , Rfl:     Vitals:    10/13/23 1421   BP: 116/74   BP Location: Left arm   Patient Position: Sitting   Cuff Size: Adult   Pulse: 75   Temp: 98.3 øF (36.8 øC)   TempSrc: Oral   SpO2: 97%   Weight: 80.8 kg (178 lb 3.2 oz)   Height: 175.3 cm (69\")     Body mass index is 26.32 kg/mý.    Vitals:    10/13/23 1421 10/13/23 1514 10/13/23 1515 10/13/23 1516   Orthostatic BP:  132/78 122/68 108/66   Orthostatic Pulse:  71 78 83   Patient Position: Sitting Lying Sitting Standing       Physical Exam  Constitutional:       General: She is not in acute distress.     Appearance: Normal appearance. She is normal weight. She is not ill-appearing or toxic-appearing.   HENT:      Head: Normocephalic and atraumatic.      Mouth/Throat:      Mouth: Mucous membranes are moist.      Pharynx: Oropharynx is clear. No oropharyngeal exudate.   Eyes:      General: No scleral icterus.     Conjunctiva/sclera: " Conjunctivae normal.      Pupils: Pupils are equal, round, and reactive to light.   Neck:      Vascular: No carotid bruit.   Cardiovascular:      Rate and Rhythm: Normal rate and regular rhythm.      Heart sounds: No murmur heard.     No friction rub. No gallop.   Pulmonary:      Effort: Pulmonary effort is normal. No respiratory distress.      Breath sounds: No wheezing, rhonchi or rales.   Musculoskeletal:      Cervical back: Normal range of motion and neck supple. No tenderness.      Right lower leg: No edema.      Left lower leg: No edema.   Lymphadenopathy:      Cervical: No cervical adenopathy.   Neurological:      General: No focal deficit present.      Mental Status: She is alert.      Cranial Nerves: No cranial nerve deficit, dysarthria or facial asymmetry.      Motor: Tremor (high frequency, moderate amp R > L action tremor in hands) present.      Gait: Gait normal.   Psychiatric:         Attention and Perception: Attention normal.         Mood and Affect: Mood normal.         Speech: Speech normal.         Behavior: Behavior normal.         Thought Content: Thought content normal.       ECG 12 Lead    Date/Time: 10/13/2023 5:58 PM  Performed by: Mark Sanchez MD    Authorized by: Mark Sanchez MD  Comparison: compared with previous ECG from 4/30/2023  Similar to previous ECG  Rhythm: sinus rhythm  Rate: normal  BPM: 68  Conduction: conduction normal  ST Segments: ST segments normal  QRS axis: normal  Other findings: T wave abnormality  Other findings comments: biphasic T waves in lateral leads    Clinical impression: non-specific ECG  Comments: QTc - 367  Indication - syncope            Assessment/ Plan  Diagnoses and all orders for this visit:    Orthostatic hypotension  -     CBC & Differential  -     TSH  -     Comprehensive Metabolic Panel  -     UA / M With / Rflx Culture([facility] ONLY) - Urine, Clean Catch  -     Lithium level  -     Adult Transthoracic Echo Complete W/ Cont if Necessary Per  "Protocol; Future  -     Holter Monitor - 24 Hour; Future    Needs flu shot  -     Fluzone (or Fluarix & Flulaval for VFC) >6mos    Long-term current use of lithium  -     CBC & Differential  -     TSH  -     Comprehensive Metabolic Panel  -     UA / M With / Rflx Culture([facility] ONLY) - Urine, Clean Catch  -     Lithium level  -     Adult Transthoracic Echo Complete W/ Cont if Necessary Per Protocol; Future  -     Holter Monitor - 24 Hour; Future    Syncope, unspecified syncope type  -     Adult Transthoracic Echo Complete W/ Cont if Necessary Per Protocol; Future  -     Holter Monitor - 24 Hour; Future    Other orders  -     ECG 12 Lead        Return for Next scheduled follow up.      Discussion:  Kisha Bourgeois is a 63 y.o. female with hx of bipolar I on long term lithium tx and hx of pituitary adenoma s/p resection on bromocriptine RTC in acute care (new issue to examiner) with concern for low blood pressure after told orthostatic in neurology office and 2 distinct events of acute vertiginous sx with subsequent LOC overlying persistent positional lightheadedness.  Home pressure log with good numbers.  OK in office, borderline orthostatics today.   EKG NSR, unchanged from prior.   Complicated picture as pt on numerous meds with potential contributory side effects, notably long term lithium (syncope, bradycardia, polydipsia, hypothyroid), primidone (tremor) and Saphris (orthostasis, dizziness).    Will check labs as noted and confirm therapeutic lithium level  Holter and ECHO as part of syncope work-up.    C/W salt and fluid increase  May need to discuss with psych and neuro moving forward if not etiology uncovered.   Endo appt upcoming for prolactin eval on long term bromocriptine.     RTC as planned.         \"I am having trouble with low blood pressure'.  Had 2 events at home where felt faint and 'everything started spinning and then next thing I knew I was on the floor'.   witnessed first event, only a " "few seconds.  No post ictal state noted.  \"Knocked the wind out of me'. Recovered a few minutes. NO confusion. Did not check pressure at that time on first event.  Second time checked and got 110 range, 'like it is today'.    Started checking pressure at home after first event and got 100-110's when checked.  No numbers < 100.    Seeing neurologist for tremor and had orthostatics on 9/6/23.    Off depakote now as recent med changes. Off lamictal after short term use.  Lithium levels have been OK.  Wants to get level drawn today.       1. Fainting event in 2022 - s/p Neurology eval 3/2023 and seen in neurology office, note reviewed. No associated cardiac sx. B-blocker tapered off. No recurrence of events and pressure OK in office today.  Some ? Element of vertigo but not inducible on exam today. Will monitor for now as pt feeling better. ? Med side effect element of dizziness at play, prominent side effect of trihexyphenidyl med. Pt to call if any recurrence of fainting.   2. Hypothyroidism - TSH variable over time, but at goal on levothyroxine daily on empty stomach. However, advised to change and take without other pills, first AM.  Trend TSH.  3. Bipolar/ Schizoaffective D/O with prominent anxiety sx element - managed per psych, Dr. Betts, on multidrug regimen. No manic events over year. Appears Artane is new med addition.Overall stable.   --> ? Mild tardive dyskinesia - noted on exam, potential Trintellix, Saphris and Artane contribution.  Has mvment d/o clinic appt, defer to their eval.  4. Essential Tremor - progressive despite high dose B-blocker, now off after syncope.  ? Complicated by lithium and Depakote.  Referred to movement d/o clinic at , appt in 6/2023. Recall, challenging issue as pt fatigue makes nearly all other options (Primidone, gabapentin, schedule Clonzepam) challenging and Primidone has signficant interactions with Depakote and Trintellix requiring coordination with psych.   5. MUNIRA,  " intolerant to CPAP -  re- eval in 2023 with positive study, but not Inspire candidate.  Declined additional CPAP and already on dental biteguard, so declines additional MAD option there. Pt refuses tx of dx at this time.   6. HLD - LDL at goal on statin. LFT's OK.    7. Hyperprolactinemia, s/p ptiuitary surgery ~200; stable f/u MRI in 2014 after likely false elevation of Prolactin by psych meds - desires to come off bromocriptine due to expense. Challengnig due to ability to get accurate prolactin baseline on psych med confounders. Check today.  If elevated will need consideration of MRI and following from new baseline on trend.  Will f/u after prolactin check.   8. HM - labs d/w pt; Flu/ Tdap/ COVID - UTD; Prenvar - today; Shingrix and Hep A at pharmacy; C-scope 2013 (1 polyp) --> 5 years, due and pt agrees, refer again today; Pap (-) 6/2021 --> 3 years;  Mammo due, breast exam OK;   Hep C Ab (-) 8/2017; add exercise; Preventative care plan provided to pt at end of visit

## 2023-10-15 LAB
ALBUMIN SERPL-MCNC: 4.4 G/DL (ref 3.5–5.2)
ALBUMIN/GLOB SERPL: 2 G/DL
ALP SERPL-CCNC: 64 U/L (ref 39–117)
ALT SERPL-CCNC: 20 U/L (ref 1–33)
APPEARANCE UR: CLEAR
AST SERPL-CCNC: 16 U/L (ref 1–32)
BACTERIA #/AREA URNS HPF: NORMAL /HPF
BACTERIA UR CULT: NORMAL
BACTERIA UR CULT: NORMAL
BASOPHILS # BLD AUTO: 0.05 10*3/MM3 (ref 0–0.2)
BASOPHILS NFR BLD AUTO: 0.6 % (ref 0–1.5)
BILIRUB SERPL-MCNC: <0.2 MG/DL (ref 0–1.2)
BILIRUB UR QL STRIP: NEGATIVE
BUN SERPL-MCNC: 9 MG/DL (ref 8–23)
BUN/CREAT SERPL: 7.8 (ref 7–25)
CALCIUM SERPL-MCNC: 10.2 MG/DL (ref 8.6–10.5)
CASTS URNS QL MICRO: NORMAL /LPF
CHLORIDE SERPL-SCNC: 105 MMOL/L (ref 98–107)
CO2 SERPL-SCNC: 24.4 MMOL/L (ref 22–29)
COLOR UR: YELLOW
CREAT SERPL-MCNC: 1.16 MG/DL (ref 0.57–1)
EGFRCR SERPLBLD CKD-EPI 2021: 53.7 ML/MIN/1.73
EOSINOPHIL # BLD AUTO: 0.15 10*3/MM3 (ref 0–0.4)
EOSINOPHIL NFR BLD AUTO: 1.9 % (ref 0.3–6.2)
EPI CELLS #/AREA URNS HPF: NORMAL /HPF (ref 0–10)
ERYTHROCYTE [DISTWIDTH] IN BLOOD BY AUTOMATED COUNT: 12.6 % (ref 12.3–15.4)
GLOBULIN SER CALC-MCNC: 2.2 GM/DL
GLUCOSE SERPL-MCNC: 98 MG/DL (ref 65–99)
GLUCOSE UR QL STRIP: NEGATIVE
HCT VFR BLD AUTO: 37 % (ref 34–46.6)
HGB BLD-MCNC: 11.9 G/DL (ref 12–15.9)
HGB UR QL STRIP: NEGATIVE
IMM GRANULOCYTES # BLD AUTO: 0.03 10*3/MM3 (ref 0–0.05)
IMM GRANULOCYTES NFR BLD AUTO: 0.4 % (ref 0–0.5)
KETONES UR QL STRIP: NEGATIVE
LEUKOCYTE ESTERASE UR QL STRIP: ABNORMAL
LITHIUM SERPL-SCNC: 0.5 MMOL/L (ref 0.6–1.2)
LYMPHOCYTES # BLD AUTO: 1.72 10*3/MM3 (ref 0.7–3.1)
LYMPHOCYTES NFR BLD AUTO: 21.7 % (ref 19.6–45.3)
MCH RBC QN AUTO: 29.4 PG (ref 26.6–33)
MCHC RBC AUTO-ENTMCNC: 32.2 G/DL (ref 31.5–35.7)
MCV RBC AUTO: 91.4 FL (ref 79–97)
MICRO URNS: ABNORMAL
MONOCYTES # BLD AUTO: 0.67 10*3/MM3 (ref 0.1–0.9)
MONOCYTES NFR BLD AUTO: 8.4 % (ref 5–12)
NEUTROPHILS # BLD AUTO: 5.31 10*3/MM3 (ref 1.7–7)
NEUTROPHILS NFR BLD AUTO: 67 % (ref 42.7–76)
NITRITE UR QL STRIP: NEGATIVE
NRBC BLD AUTO-RTO: 0 /100 WBC (ref 0–0.2)
PH UR STRIP: 6 [PH] (ref 5–7.5)
PLATELET # BLD AUTO: 240 10*3/MM3 (ref 140–450)
POTASSIUM SERPL-SCNC: 4.5 MMOL/L (ref 3.5–5.2)
PROT SERPL-MCNC: 6.6 G/DL (ref 6–8.5)
PROT UR QL STRIP: NEGATIVE
RBC # BLD AUTO: 4.05 10*6/MM3 (ref 3.77–5.28)
RBC #/AREA URNS HPF: NORMAL /HPF (ref 0–2)
SODIUM SERPL-SCNC: 138 MMOL/L (ref 136–145)
SP GR UR STRIP: 1.01 (ref 1–1.03)
TSH SERPL DL<=0.005 MIU/L-ACNC: 0.59 UIU/ML (ref 0.27–4.2)
URINALYSIS REFLEX: ABNORMAL
UROBILINOGEN UR STRIP-MCNC: 0.2 MG/DL (ref 0.2–1)
WBC # BLD AUTO: 7.93 10*3/MM3 (ref 3.4–10.8)
WBC #/AREA URNS HPF: NORMAL /HPF (ref 0–5)

## 2023-10-19 ENCOUNTER — TELEPHONE (OUTPATIENT)
Dept: INTERNAL MEDICINE | Facility: CLINIC | Age: 63
End: 2023-10-19

## 2023-10-19 NOTE — TELEPHONE ENCOUNTER
Certainly, eustachian tube dysfunction can cause noted head symptoms.  Reasonable to try over-the-counter Flonase nasal spray to address potential eustachian tube dysfunction.  Otherwise hard to address over message.  Call if not improved after several weeks of Flonase use.    I have an order for a Holter and echo in the chart entered at our recent visit.  Please inquire with scheduling about status of these ordered studies.

## 2023-10-19 NOTE — TELEPHONE ENCOUNTER
Caller: Kisha Bourgeois     Relationship: SELF    Best call back number: [phone]     What is your medical concern?     PRESSURE IN HEAD    PATIENT HAS BEEN HAVING HEAD PRESSURE OFF AND ON FOR SEVERAL MONTHS AND IT IS BACK AGAIN.  SHE IS WANTING TO KNOW IS IT POSSIBLE THAT THIS IS EAR RELATED?    DOES SHE NEED TO COME BACK INTO THE OFFICE TO BE SEEN AGAIN FOR THIS?      SHE WAS IN THE OFFICE ON FRIDAY THE 13TH AND WAS TOLD THAT SHE NEEDS TO SEE A CARDIOLOGIST AND HAS NOT HEARD ANYTHING  AND WOULD LIKE FOR SOMEONE TO FOLLOW UP WITH HER ON THAT.    PLEASE ADVISE

## 2023-10-20 ENCOUNTER — TELEPHONE (OUTPATIENT)
Dept: INTERNAL MEDICINE | Facility: CLINIC | Age: 63
End: 2023-10-20

## 2023-10-20 NOTE — TELEPHONE ENCOUNTER
Caller: Kisha Bourgeois    Relationship: Self    Best call back number: [phone]     What is the medical concern/diagnosis:      What specialty or service is being requested:      What is the provider, practice or medical service name:      What is the office location:      What is the office phone number:      Any additional details:  PATIENT CALLED STATED THAT SHE HAD TO CANCEL COUPLE OF APPOINTMENTS WITH THE ENDOCRINOLOGIST DR LEE AND NOW NOT ABLE TO GET IN UNTIL 12/19/23.  THEY WILL NOT LET HER SCHEDULE WITH ANYONE ELSE IN THE OFFICE.  SHE STATED SHE DON'T MIND WAITING BUT THAT DR SANTO NEEDS THE RESULTS PRIOR TO THIS VISIT.    CAN YOU PLEASE REFER HER TO ANOTHER ENDOCRINOLOGIST THAT SHE WILL BE ABLE TO GET INTO SOONER.

## 2023-10-23 NOTE — TELEPHONE ENCOUNTER
Well, certainly part of our work-up is the cardiac (Holter and ECHO).  I also see on the chart that the patient has an appt with Dr. Singh in Endo on 11/1/23.  This should work out well then if able to keep that appt.

## 2023-10-26 ENCOUNTER — PREP FOR SURGERY (OUTPATIENT)
Dept: OTHER | Facility: HOSPITAL | Age: 63
End: 2023-10-26
Payer: MEDICARE

## 2023-11-07 ENCOUNTER — HOSPITAL ENCOUNTER (OUTPATIENT)
Dept: CARDIOLOGY | Facility: HOSPITAL | Age: 63
Discharge: HOME | End: 2023-11-07
Payer: COMMERCIAL

## 2023-11-07 VITALS
BODY MASS INDEX: 26.36 KG/M2 | DIASTOLIC BLOOD PRESSURE: 77 MMHG | SYSTOLIC BLOOD PRESSURE: 126 MMHG | HEART RATE: 77 BPM | HEIGHT: 69 IN | WEIGHT: 178 LBS

## 2023-11-07 DIAGNOSIS — R55 SYNCOPE, UNSPECIFIED SYNCOPE TYPE: ICD-10-CM

## 2023-11-07 DIAGNOSIS — I95.1 ORTHOSTATIC HYPOTENSION: ICD-10-CM

## 2023-11-07 DIAGNOSIS — Z79.899 LONG-TERM CURRENT USE OF LITHIUM: ICD-10-CM

## 2023-11-07 LAB
AORTIC ARCH: 1.9 CM
AORTIC DIMENSIONLESS INDEX: 0.7 (DI)
ASCENDING AORTA: 2.9 CM
BH CV ECHO MEAS - ACS: 2.03 CM
BH CV ECHO MEAS - AO MEAN PG: 3 MMHG
BH CV ECHO MEAS - AO ROOT DIAM: 2.7 CM
BH CV ECHO MEAS - AO V2 VTI: 22.2 CM
BH CV ECHO MEAS - AVA(I,D): 2.34 CM2
BH CV ECHO MEAS - EDV(CUBED): 103.8 ML
BH CV ECHO MEAS - EDV(MOD-SP2): 98 ML
BH CV ECHO MEAS - EDV(MOD-SP4): 114 ML
BH CV ECHO MEAS - EF(MOD-BP): 60.2 %
BH CV ECHO MEAS - EF(MOD-SP2): 63.3 %
BH CV ECHO MEAS - EF(MOD-SP4): 58.8 %
BH CV ECHO MEAS - ESV(CUBED): 20.7 ML
BH CV ECHO MEAS - ESV(MOD-SP2): 36 ML
BH CV ECHO MEAS - ESV(MOD-SP4): 47 ML
BH CV ECHO MEAS - FS: 41.5 %
BH CV ECHO MEAS - IVS/LVPW: 1 CM
BH CV ECHO MEAS - IVSD: 1.1 CM
BH CV ECHO MEAS - LAT PEAK E' VEL: 6.4 CM/SEC
BH CV ECHO MEAS - LV DIASTOLIC VOL/BSA (35-75): 58 CM2
BH CV ECHO MEAS - LV MASS(C)D: 187.5 GRAMS
BH CV ECHO MEAS - LV MEAN PG: 1 MMHG
BH CV ECHO MEAS - LV SYSTOLIC VOL/BSA (12-30): 23.9 CM2
BH CV ECHO MEAS - LV V1 VTI: 15.2 CM
BH CV ECHO MEAS - LVIDD: 4.7 CM
BH CV ECHO MEAS - LVIDS: 2.7 CM
BH CV ECHO MEAS - LVOT AREA: 3.4 CM2
BH CV ECHO MEAS - LVOT DIAM: 2.09 CM
BH CV ECHO MEAS - LVPWD: 1.1 CM
BH CV ECHO MEAS - MED PEAK E' VEL: 6 CM/SEC
BH CV ECHO MEAS - MV A DUR: 0.13 SEC
BH CV ECHO MEAS - MV A MAX VEL: 68.1 CM/SEC
BH CV ECHO MEAS - MV DEC SLOPE: 355.8 CM/SEC2
BH CV ECHO MEAS - MV DEC TIME: 164 SEC
BH CV ECHO MEAS - MV E MAX VEL: 44.6 CM/SEC
BH CV ECHO MEAS - MV E/A: 0.65
BH CV ECHO MEAS - MV MAX PG: 3 MMHG
BH CV ECHO MEAS - MV MEAN PG: 1.19 MMHG
BH CV ECHO MEAS - MV P1/2T: 59.2 MSEC
BH CV ECHO MEAS - MV V2 VTI: 19 CM
BH CV ECHO MEAS - MVA(P1/2T): 3.7 CM2
BH CV ECHO MEAS - MVA(VTI): 2.7 CM2
BH CV ECHO MEAS - PA ACC TIME: 0.16 SEC
BH CV ECHO MEAS - PA V2 MAX: 86.9 CM/SEC
BH CV ECHO MEAS - PULM A REVS DUR: 0.13 SEC
BH CV ECHO MEAS - PULM A REVS VEL: 33.8 CM/SEC
BH CV ECHO MEAS - PULM DIAS VEL: 51.8 CM/SEC
BH CV ECHO MEAS - PULM S/D: 0.9
BH CV ECHO MEAS - PULM SYS VEL: 46.7 CM/SEC
BH CV ECHO MEAS - QP/QS: 1.23
BH CV ECHO MEAS - RV MAX PG: 2.22 MMHG
BH CV ECHO MEAS - RV V1 MAX: 74.6 CM/SEC
BH CV ECHO MEAS - RV V1 VTI: 16 CM
BH CV ECHO MEAS - RVOT DIAM: 2.26 CM
BH CV ECHO MEAS - SI(MOD-SP2): 31.5 ML/M2
BH CV ECHO MEAS - SI(MOD-SP4): 34.1 ML/M2
BH CV ECHO MEAS - SV(LVOT): 52 ML
BH CV ECHO MEAS - SV(MOD-SP2): 62 ML
BH CV ECHO MEAS - SV(MOD-SP4): 67 ML
BH CV ECHO MEAS - SV(RVOT): 63.8 ML
BH CV ECHO MEAS - TAPSE (>1.6): 2 CM
BH CV ECHO MEASUREMENTS AVERAGE E/E' RATIO: 7.19
BH CV XLRA - RV BASE: 2.8 CM
BH CV XLRA - RV LENGTH: 6.9 CM
BH CV XLRA - RV MID: 2.26 CM
BH CV XLRA - TDI S': 10.6 CM/SEC
LEFT ATRIUM VOLUME INDEX: 20.4 ML/M2
SINUS: 3 CM
STJ: 2.6 CM

## 2023-11-07 PROCEDURE — 93306 TTE W/DOPPLER COMPLETE: CPT

## 2023-11-07 PROCEDURE — 93226 XTRNL ECG REC<48 HR SCAN A/R: CPT

## 2023-11-07 PROCEDURE — 93306 TTE W/DOPPLER COMPLETE: CPT | Performed by: INTERNAL MEDICINE

## 2023-11-07 PROCEDURE — 93225 XTRNL ECG REC<48 HRS REC: CPT

## 2023-11-07 PROCEDURE — 25510000001 PERFLUTREN (DEFINITY) 8.476 MG IN SODIUM CHLORIDE (PF) 0.9 % 10 ML INJECTION: Performed by: INTERNAL MEDICINE

## 2023-11-07 RX ADMIN — SODIUM CHLORIDE 2 ML: 9 INJECTION INTRAMUSCULAR; INTRAVENOUS; SUBCUTANEOUS at 09:47

## 2023-11-09 PROCEDURE — 93227 XTRNL ECG REC<48 HR R&I: CPT | Performed by: INTERNAL MEDICINE

## 2023-11-10 ENCOUNTER — TELEPHONE (OUTPATIENT)
Dept: INTERNAL MEDICINE | Facility: CLINIC | Age: 63
End: 2023-11-10
Payer: MEDICARE

## 2023-11-10 NOTE — TELEPHONE ENCOUNTER
----- Message from Mark Sanchez MD sent at 11/10/2023 12:47 PM EST -----  Results called to pt 11/10/23  Holter overall normal.  Good news.  Will review with patient in more detail at upcoming visit.

## 2023-11-14 ENCOUNTER — OFFICE VISIT (OUTPATIENT)
Dept: INTERNAL MEDICINE | Facility: CLINIC | Age: 63
End: 2023-11-14
Payer: COMMERCIAL

## 2023-11-14 VITALS
DIASTOLIC BLOOD PRESSURE: 82 MMHG | HEIGHT: 69 IN | WEIGHT: 180 LBS | HEART RATE: 77 BPM | SYSTOLIC BLOOD PRESSURE: 120 MMHG | BODY MASS INDEX: 26.66 KG/M2 | OXYGEN SATURATION: 98 %

## 2023-11-14 DIAGNOSIS — Z23 NEED FOR COVID-19 VACCINE: ICD-10-CM

## 2023-11-14 DIAGNOSIS — H90.3 ASYMMETRIC SNHL (SENSORINEURAL HEARING LOSS): Primary | ICD-10-CM

## 2023-11-14 DIAGNOSIS — E22.1 HYPERPROLACTINEMIA: ICD-10-CM

## 2023-11-14 DIAGNOSIS — R42 DIZZINESS: ICD-10-CM

## 2023-11-14 DIAGNOSIS — H93.13 TINNITUS AURIUM, BILATERAL: ICD-10-CM

## 2023-11-14 DIAGNOSIS — F31.9 BIPOLAR 1 DISORDER: ICD-10-CM

## 2023-11-14 NOTE — PROGRESS NOTES
"follow-up on cardiac testing  (Pressure in head and headache x 5 months. ) and Tinnitus (Left ear x 2 weeks)      HPI  Kisha Bourgeois is a 63 y.o. female RTC in short interval f/u after recent eval for low pressure events and vertiginous sx/ positional lightheadedness.  Since last visit notes 'blood pressure has been more steady' using salt pills from Dr. Feliciano and getting ~125-140 and low pressure events. No LOC additional events. Dizziness is  better. However forgot to mention last visit 'I was having a good day' that had 'pressure over head over frontal area'.   Had massage and helped for a few days. Planning more massage options.     New issue of tinnitus in L ear over last few weeks, variable, worse when pressure in head is worst.  No change in hearing noted.  Not had hearing test.     Has appt with DR. Singh  and had to cancel.  Has appt 12/19/23 now.    Changes from psych other than off depakote and lowered lithium dosing.  Took off Lamictal as well. Was done prior to last visit.     Wonders if needs to have brain imaging.  Has not gotten recommendation from neurology or otherwise for brain imaging.  \"I wondered about that\".    Notes, \"I have really suffered over the last 5 months.  I feel like I cannot walk like I used to.  I had to go to the hospital the other day and I had to use a cane to walk down the soriano to steady myself.\"    Addendum: Reviewed case with Dr. Mendoza and neurology from [facility] on 11/20/2023, via phone.  Notes her diagnosis is tremor \"absolutely\" related to psychiatric medications.  Orthostatic issues were noted by patient to be long-term for years and rather dismissed in her office.  Headache issue felt to be tension headache and no additional head imaging requested from neurology.   Called and D/W patient via phone on 11/21/2023 and reviewed my discussion with neurology.  Patient provides new history that got a repeat massage and once again resolved headache for a few days after " massage.  However headache has returned.      Answers submitted by the patient for this visit:  Other (Submitted on 11/13/2023)  Please describe your symptoms.: Pressure and headaches in head. Follow-up for cardiac testing.  Have you had these symptoms before?: Yes  How long have you been having these symptoms?: Greater than 2 weeks  Please list any medications you are currently taking for this condition.: Ibuprofen  Please describe any probable cause for these symptoms. : Unknown  Primary Reason for Visit (Submitted on 11/13/2023)  What is the primary reason for your visit?: Other    Review of Systems   Constitutional: Negative for chills and fever.   HENT:  Positive for tinnitus. Negative for ear discharge, ear pain and hearing loss.    Eyes:  Negative for double vision, vision loss in left eye, vision loss in right eye and visual disturbance.   Neurological:  Positive for dizziness, headaches (Head pressure, variable day today) and loss of balance. Negative for weakness.       The following portions of the patient's history were reviewed and updated as appropriate: allergies, current medications, past medical history, past social history, and problem list.      Current Outpatient Medications:     ALPRAZolam (XANAX) 0.5 MG tablet, Take 1 tablet by mouth 2 (Two) Times a Day As Needed for Anxiety., Disp: , Rfl:     bromocriptine (PARLODEL) 2.5 MG tablet, TAKE 2 TABLETS EVERY       MORNING AND TAKE 2 TABLETS EVERY EVENING, Disp: 180 tablet, Rfl: 2    buPROPion XL (WELLBUTRIN XL) 150 MG 24 hr tablet, 1 tablet Every Morning., Disp: , Rfl:     lithium (ESKALITH) 450 MG CR tablet, Take 1 tablet by mouth Every Night. Alternate with depakote, Disp: , Rfl:     primidone (MYSOLINE) 50 MG tablet, Take 2 tablets by mouth Every Night., Disp: , Rfl:     SAPHRIS 10 MG sublingual tablet sublingual tablet, 1 tablet Every Night., Disp: , Rfl:     simvastatin (ZOCOR) 40 MG tablet, TAKE 1 TABLET DAILY AS     DIRECTED, Disp: 90 tablet,  "Rfl: 2    Synthroid 100 MCG tablet, TAKE 1 TABLET DAILY, Disp: 90 tablet, Rfl: 3    Vortioxetine HBr (TRINTELLIX) 10 MG tablet tablet, Take 1 tablet by mouth Daily With Breakfast., Disp: , Rfl:     Vitals:    11/14/23 1542   BP: 120/82   Pulse: 77   SpO2: 98%   Weight: 81.6 kg (180 lb)   Height: 175.3 cm (69\")     Body mass index is 26.58 kg/m².    Vitals:    11/14/23 1621 11/14/23 1622 11/14/23 1623   Orthostatic BP: 108/68 145/80 112/73   Orthostatic Pulse: 79 73 73   Patient Position: Sitting Lying Standing       Physical Exam  Constitutional:       General: She is not in acute distress.     Appearance: Normal appearance. She is not ill-appearing or toxic-appearing.   HENT:      Head: Normocephalic and atraumatic.      Right Ear: Tympanic membrane, ear canal and external ear normal. No decreased hearing (By finger rub) noted. There is no impacted cerumen.      Left Ear: Tympanic membrane, ear canal and external ear normal. Decreased hearing (To finger rub, compared to right) noted. There is no impacted cerumen.      Ears:      Comments: Hum test negative, patient indicates hears symmetrically     Nose: Nose normal. No nasal tenderness, mucosal edema or rhinorrhea.      Right Sinus: No maxillary sinus tenderness or frontal sinus tenderness.      Left Sinus: No maxillary sinus tenderness or frontal sinus tenderness.      Mouth/Throat:      Mouth: Mucous membranes are moist. No oral lesions.      Tongue: No lesions.      Palate: No lesions.      Pharynx: Oropharynx is clear. No pharyngeal swelling, oropharyngeal exudate or posterior oropharyngeal erythema.   Eyes:      General: No scleral icterus.     Extraocular Movements: Extraocular movements intact.      Pupils: Pupils are equal, round, and reactive to light.   Cardiovascular:      Rate and Rhythm: Normal rate and regular rhythm.   Pulmonary:      Effort: Pulmonary effort is normal. No respiratory distress.      Breath sounds: No stridor. No wheezing or rales. "   Musculoskeletal:      Cervical back: Normal range of motion and neck supple.   Lymphadenopathy:      Cervical: No cervical adenopathy.   Neurological:      General: No focal deficit present.      Mental Status: She is alert.      Cranial Nerves: No cranial nerve deficit, dysarthria or facial asymmetry.      Motor: Tremor present.      Gait: Gait normal.   Psychiatric:         Behavior: Behavior normal.         Thought Content: Thought content normal.         Assessment/ Plan  Diagnoses and all orders for this visit:    Asymmetric SNHL (sensorineural hearing loss)  -     Ambulatory Referral to Audiology    Tinnitus aurium, bilateral  -     Ambulatory Referral to Audiology    Bipolar 1 disorder    Dizziness    Hyperprolactinemia    Need for COVID-19 vaccine  -     COVID-19 F23 (Pfizer) 12yrs+ (COMIRNATY)        No follow-ups on file.      Discussion:  Kisha Bourgeois is a 63 y.o. female RTC in short interval f/u after recent eval for low pressure events and vertiginous sx/ positional lightheadedness.  Recent cardiac eval with Holter monitor and echo unrevealing.  Responding to salt tabs from neurology with improved symptoms and fluid intake.   Patient mentions today new issue to examiner of 5 months of head pressure often later in the day that responds to massage therapy for the short-term, and new issue of tinnitus.  Exam notable today for asymmetric hearing loss by finger rub only on left, normal hum test.    Has canceled and deferred endocrinology evaluation until early December for persistent prolactin elevation on bromocriptine,  history of adenoma s/p removal 2001  Complicated picture as pt on numerous meds with potential contributory side effects, notably long term lithium (syncope, bradycardia, polydipsia, hypothyroid), primidone (tremor) and Saphris (orthostasis, dizziness).  Additionally, D/W patient had imaging complicated as MRI brain appropriate for HA, MRI auditory canals for new issue of tinnitus and  asymmetric hearing loss, and MRI pituitary for prolactin issue.  -Refer to audiology at ENT office [facility] for hearing testing to assess need for further evaluation of auditory canals.  -Await endocrinology evaluation upcoming.  Will help determine if need imaging of the pituitary  -Continue with increased fluid intake noted improved symptoms from positional blood pressure standpoint.  -Copy psychiatrist on note today  -Reassess after above information gathered.  Will need to take additional look at balance issue and need for cane use from time to time as this was not evident on my office visit nor did neurology acknowledge this as pathologic element.  Will reassess.  -COVID booster today

## 2023-12-11 RX ORDER — BROMOCRIPTINE MESYLATE 2.5 MG/1
TABLET ORAL
Qty: 180 TABLET | Refills: 2 | Status: SHIPPED | OUTPATIENT
Start: 2023-12-11

## 2023-12-19 ENCOUNTER — TRANSCRIBE ORDERS (OUTPATIENT)
Dept: ADMINISTRATIVE | Facility: HOSPITAL | Age: 63
End: 2023-12-19
Payer: MEDICARE

## 2023-12-19 DIAGNOSIS — D35.2 PITUITARY ADENOMA: Primary | ICD-10-CM

## 2024-01-23 ENCOUNTER — HOSPITAL ENCOUNTER (OUTPATIENT)
Dept: MRI IMAGING | Facility: HOSPITAL | Age: 64
Discharge: HOME | End: 2024-01-23
Admitting: INTERNAL MEDICINE
Payer: COMMERCIAL

## 2024-01-23 DIAGNOSIS — D35.2 PITUITARY ADENOMA: ICD-10-CM

## 2024-01-23 PROCEDURE — A9577 INJ MULTIHANCE: HCPCS | Performed by: INTERNAL MEDICINE

## 2024-01-23 PROCEDURE — 0 GADOBENATE DIMEGLUMINE 529 MG/ML SOLUTION: Performed by: INTERNAL MEDICINE

## 2024-01-23 PROCEDURE — 70553 MRI BRAIN STEM W/O & W/DYE: CPT

## 2024-01-23 PROCEDURE — 82565 ASSAY OF CREATININE: CPT

## 2024-01-23 RX ADMIN — GADOBENATE DIMEGLUMINE 17 ML: 529 INJECTION, SOLUTION INTRAVENOUS at 18:22

## 2024-01-24 LAB — CREAT BLDA-MCNC: 1.3 MG/DL (ref 0.6–1.3)

## 2024-03-07 ENCOUNTER — TELEPHONE (OUTPATIENT)
Dept: INTERNAL MEDICINE | Facility: CLINIC | Age: 64
End: 2024-03-07

## 2024-03-07 NOTE — TELEPHONE ENCOUNTER
Caller: Kisha Bourgeois    Relationship to patient: Self    Best call back number: [phone]    Patient is needing: PATIENT WOULD LIKE TO HAVE THE LITHIUM RESULTS FROM OCTOBER FORWARDED TO HER PSYCHIATRIST.   PLEASE SEND TO   FAX: [phone]

## 2024-04-09 RX ORDER — SIMVASTATIN 40 MG
TABLET ORAL
Qty: 90 TABLET | Refills: 2 | Status: SHIPPED | OUTPATIENT
Start: 2024-04-09

## 2024-04-24 ENCOUNTER — TELEPHONE (OUTPATIENT)
Dept: INTERNAL MEDICINE | Facility: CLINIC | Age: 64
End: 2024-04-24

## 2024-04-24 NOTE — TELEPHONE ENCOUNTER
Caller: Kisha Bourgeois    Relationship: Self    Best call back number: [phone]    What was the call regarding: PATIENT STATED [facility] ADVISED THEY ARE WAITING FOR MORE INFORMATION FROM DR SANTO TO FILL THE Synthroid 100 MCG tablet  .    PLEASE CALL ONCE RESOLVED.

## 2024-04-25 DIAGNOSIS — E03.9 HYPOTHYROIDISM, UNSPECIFIED TYPE: Primary | ICD-10-CM

## 2024-04-25 RX ORDER — LEVOTHYROXINE SODIUM 0.1 MG/1
100 TABLET ORAL DAILY
Qty: 90 TABLET | Refills: 3 | Status: SHIPPED | OUTPATIENT
Start: 2024-04-25

## 2024-06-06 ENCOUNTER — TELEPHONE (OUTPATIENT)
Dept: INTERNAL MEDICINE | Facility: CLINIC | Age: 64
End: 2024-06-06

## 2024-06-06 NOTE — TELEPHONE ENCOUNTER
Caller: Kisha Bourgeois    Relationship: Self    Best call back number: [phone]     Who are you requesting to speak with (clinical staff, provider,  specific staff member): DR SANTO OR MA    What was the call regarding: PATIENT STATES THAT SHE HAS HAD ONGOING ISSUE WITH PAIN AND SWELLING IN HER EYE. REQUESTS CALL BACK TO DISCUSS RECOMMENDED OPHTHALMOLOGISTS

## 2024-06-06 NOTE — TELEPHONE ENCOUNTER
Spoke to patient. Advised dr. Sanchez is unavailable for the next upcoming weeks. Advised would need to be seen by one of the covering providers for referral and requested eye drop. Patient refused appointment and stated she would wait to see what happens. Advised patient to call if need anything or changes mind    Aware and acknowledged

## 2024-06-24 DIAGNOSIS — Z13.1 SCREENING FOR DIABETES MELLITUS: ICD-10-CM

## 2024-06-24 DIAGNOSIS — E78.2 MIXED HYPERLIPIDEMIA: ICD-10-CM

## 2024-06-24 DIAGNOSIS — Z00.00 HEALTHCARE MAINTENANCE: Primary | ICD-10-CM

## 2024-06-24 DIAGNOSIS — E03.9 HYPOTHYROIDISM, UNSPECIFIED TYPE: ICD-10-CM

## 2024-06-24 DIAGNOSIS — E22.1 HYPERPROLACTINEMIA: ICD-10-CM

## 2024-06-27 LAB
ALBUMIN SERPL-MCNC: 4.4 G/DL (ref 3.5–5.2)
ALBUMIN/GLOB SERPL: 2.2 G/DL
ALP SERPL-CCNC: 70 U/L (ref 39–117)
ALT SERPL-CCNC: 27 U/L (ref 1–33)
APPEARANCE UR: CLEAR
AST SERPL-CCNC: 18 U/L (ref 1–32)
BACTERIA #/AREA URNS HPF: ABNORMAL /[HPF]
BACTERIA UR CULT: NORMAL
BACTERIA UR CULT: NORMAL
BASOPHILS # BLD AUTO: 0.04 10*3/MM3 (ref 0–0.2)
BASOPHILS NFR BLD AUTO: 0.5 % (ref 0–1.5)
BILIRUB SERPL-MCNC: <0.2 MG/DL (ref 0–1.2)
BILIRUB UR QL STRIP: NEGATIVE
BUN SERPL-MCNC: 15 MG/DL (ref 8–23)
BUN/CREAT SERPL: 12.9 (ref 7–25)
CALCIUM SERPL-MCNC: 10.3 MG/DL (ref 8.6–10.5)
CASTS URNS QL MICRO: ABNORMAL /LPF
CHLORIDE SERPL-SCNC: 107 MMOL/L (ref 98–107)
CHOLEST SERPL-MCNC: 201 MG/DL (ref 0–200)
CO2 SERPL-SCNC: 23.8 MMOL/L (ref 22–29)
COLOR UR: YELLOW
CREAT SERPL-MCNC: 1.16 MG/DL (ref 0.57–1)
EGFRCR SERPLBLD CKD-EPI 2021: 53.7 ML/MIN/1.73
EOSINOPHIL # BLD AUTO: 0.17 10*3/MM3 (ref 0–0.4)
EOSINOPHIL NFR BLD AUTO: 2 % (ref 0.3–6.2)
EPI CELLS #/AREA URNS HPF: ABNORMAL /HPF (ref 0–10)
ERYTHROCYTE [DISTWIDTH] IN BLOOD BY AUTOMATED COUNT: 13 % (ref 12.3–15.4)
GLOBULIN SER CALC-MCNC: 2 GM/DL
GLUCOSE SERPL-MCNC: 95 MG/DL (ref 65–99)
GLUCOSE UR QL STRIP: NEGATIVE
HBA1C MFR BLD: 5.4 % (ref 4.8–5.6)
HCT VFR BLD AUTO: 38 % (ref 34–46.6)
HDLC SERPL-MCNC: 84 MG/DL (ref 40–60)
HGB BLD-MCNC: 12.4 G/DL (ref 12–15.9)
HGB UR QL STRIP: NEGATIVE
IMM GRANULOCYTES # BLD AUTO: 0.03 10*3/MM3 (ref 0–0.05)
IMM GRANULOCYTES NFR BLD AUTO: 0.4 % (ref 0–0.5)
KETONES UR QL STRIP: NEGATIVE
LDLC SERPL CALC-MCNC: 94 MG/DL (ref 0–100)
LDLC/HDLC SERPL: 1.07 {RATIO}
LEUKOCYTE ESTERASE UR QL STRIP: ABNORMAL
LYMPHOCYTES # BLD AUTO: 2 10*3/MM3 (ref 0.7–3.1)
LYMPHOCYTES NFR BLD AUTO: 23.7 % (ref 19.6–45.3)
MCH RBC QN AUTO: 30.6 PG (ref 26.6–33)
MCHC RBC AUTO-ENTMCNC: 32.6 G/DL (ref 31.5–35.7)
MCV RBC AUTO: 93.8 FL (ref 79–97)
MICRO URNS: ABNORMAL
MONOCYTES # BLD AUTO: 0.67 10*3/MM3 (ref 0.1–0.9)
MONOCYTES NFR BLD AUTO: 7.9 % (ref 5–12)
NEUTROPHILS # BLD AUTO: 5.52 10*3/MM3 (ref 1.7–7)
NEUTROPHILS NFR BLD AUTO: 65.5 % (ref 42.7–76)
NITRITE UR QL STRIP: NEGATIVE
NRBC BLD AUTO-RTO: 0 /100 WBC (ref 0–0.2)
PH UR STRIP: 6.5 [PH] (ref 5–7.5)
PLATELET # BLD AUTO: 231 10*3/MM3 (ref 140–450)
POTASSIUM SERPL-SCNC: 4.5 MMOL/L (ref 3.5–5.2)
PROT SERPL-MCNC: 6.4 G/DL (ref 6–8.5)
PROT UR QL STRIP: NEGATIVE
RBC # BLD AUTO: 4.05 10*6/MM3 (ref 3.77–5.28)
RBC #/AREA URNS HPF: ABNORMAL /HPF (ref 0–2)
SODIUM SERPL-SCNC: 140 MMOL/L (ref 136–145)
SP GR UR STRIP: 1.01 (ref 1–1.03)
TRIGL SERPL-MCNC: 134 MG/DL (ref 0–150)
TSH SERPL DL<=0.005 MIU/L-ACNC: 1.23 UIU/ML (ref 0.27–4.2)
URINALYSIS REFLEX: ABNORMAL
UROBILINOGEN UR STRIP-MCNC: 0.2 MG/DL (ref 0.2–1)
VLDLC SERPL CALC-MCNC: 23 MG/DL (ref 5–40)
WBC # BLD AUTO: 8.43 10*3/MM3 (ref 3.4–10.8)
WBC #/AREA URNS HPF: ABNORMAL /HPF (ref 0–5)

## 2024-07-02 ENCOUNTER — OFFICE VISIT (OUTPATIENT)
Dept: INTERNAL MEDICINE | Facility: CLINIC | Age: 64
End: 2024-07-02
Payer: MEDICARE

## 2024-07-02 VITALS
SYSTOLIC BLOOD PRESSURE: 118 MMHG | HEART RATE: 72 BPM | OXYGEN SATURATION: 99 % | TEMPERATURE: 97.5 F | WEIGHT: 181.2 LBS | HEIGHT: 69 IN | DIASTOLIC BLOOD PRESSURE: 72 MMHG | BODY MASS INDEX: 26.84 KG/M2

## 2024-07-02 DIAGNOSIS — E22.1 HYPERPROLACTINEMIA: ICD-10-CM

## 2024-07-02 DIAGNOSIS — Z86.018 HISTORY OF PITUITARY ADENOMA: ICD-10-CM

## 2024-07-02 DIAGNOSIS — Z12.31 ENCOUNTER FOR SCREENING MAMMOGRAM FOR MALIGNANT NEOPLASM OF BREAST: ICD-10-CM

## 2024-07-02 DIAGNOSIS — F31.9 BIPOLAR 1 DISORDER: ICD-10-CM

## 2024-07-02 DIAGNOSIS — F25.0 SCHIZOAFFECTIVE DISORDER, BIPOLAR TYPE: ICD-10-CM

## 2024-07-02 DIAGNOSIS — H61.92 SKIN LESION OF LEFT EXTERNAL EAR: ICD-10-CM

## 2024-07-02 DIAGNOSIS — Z12.39 SCREENING BREAST EXAMINATION: ICD-10-CM

## 2024-07-02 DIAGNOSIS — E78.2 MIXED HYPERLIPIDEMIA: ICD-10-CM

## 2024-07-02 DIAGNOSIS — R94.31 ABNORMAL ELECTROCARDIOGRAM: ICD-10-CM

## 2024-07-02 DIAGNOSIS — Z00.01 ENCOUNTER FOR GENERAL ADULT MEDICAL EXAMINATION WITH ABNORMAL FINDINGS: ICD-10-CM

## 2024-07-02 DIAGNOSIS — D12.6 ADENOMATOUS POLYP OF COLON, UNSPECIFIED PART OF COLON: ICD-10-CM

## 2024-07-02 DIAGNOSIS — G25.0 HEREDITARY ESSENTIAL TREMOR: ICD-10-CM

## 2024-07-02 DIAGNOSIS — Z12.4 SCREENING FOR CERVICAL CANCER: ICD-10-CM

## 2024-07-02 DIAGNOSIS — G47.33 OBSTRUCTIVE SLEEP APNEA SYNDROME: ICD-10-CM

## 2024-07-02 DIAGNOSIS — E03.9 HYPOTHYROIDISM, UNSPECIFIED TYPE: ICD-10-CM

## 2024-07-02 DIAGNOSIS — Z00.00 ENCOUNTER FOR SUBSEQUENT ANNUAL WELLNESS VISIT (AWV) IN MEDICARE PATIENT: Primary | ICD-10-CM

## 2024-07-02 PROBLEM — Z12.11 SCREEN FOR COLON CANCER: Status: RESOLVED | Noted: 2023-06-14 | Resolved: 2024-07-02

## 2024-07-02 PROCEDURE — 1159F MED LIST DOCD IN RCRD: CPT | Performed by: INTERNAL MEDICINE

## 2024-07-02 PROCEDURE — 1125F AMNT PAIN NOTED PAIN PRSNT: CPT | Performed by: INTERNAL MEDICINE

## 2024-07-02 PROCEDURE — 99214 OFFICE O/P EST MOD 30 MIN: CPT | Performed by: INTERNAL MEDICINE

## 2024-07-02 PROCEDURE — G0439 PPPS, SUBSEQ VISIT: HCPCS | Performed by: INTERNAL MEDICINE

## 2024-07-02 PROCEDURE — 1160F RVW MEDS BY RX/DR IN RCRD: CPT | Performed by: INTERNAL MEDICINE

## 2024-07-02 PROCEDURE — G0101 CA SCREEN;PELVIC/BREAST EXAM: HCPCS | Performed by: INTERNAL MEDICINE

## 2024-07-02 RX ORDER — LITHIUM CARBONATE 300 MG
300 TABLET ORAL 2 TIMES DAILY
COMMUNITY
Start: 2024-04-23

## 2024-07-02 NOTE — PROGRESS NOTES
Subjective   Kisha Bourgeois is a 63 y.o. female who presents for a Medicare Well Visit    Patient Care Team:  Mark Sanchez MD as PCP - General  Mark Sanchez MD as PCP - Family Medicine    Recent Hospitalizations: No recent hospitalization(s).    Depression Screen:       7/2/2024     3:50 PM   PHQ-2/PHQ-9 Depression Screening   Little Interest or Pleasure in Doing Things 0-->not at all   Feeling Down, Depressed or Hopeless 0-->not at all   PHQ-9: Brief Depression Severity Measure Score 0       Functional and Cognitive Screening:      3/13/2020     2:00 PM   Functional & Cognitive Status   Do you have difficulty preparing food and eating? No   Do you have difficulty bathing yourself, getting dressed or grooming yourself? No   Do you have difficulty using the toilet? No   Do you have difficulty moving around from place to place? No   Do you have trouble with steps or getting out of a bed or a chair? No   Current Diet Limited Junk Food   Dental Exam Up to date   Eye Exam Up to date   Exercise (times per week) 0 times per week   Current Exercise Activities Include None   Do you need help using the phone?  No   Are you deaf or do you have serious difficulty hearing?  No   Do you need help to go to places out of walking distance? Yes   Do you need help shopping? No   Do you need help preparing meals?  No   Do you need help with housework?  No   Do you need help with laundry? No   Do you need help taking your medications? No   Do you need help managing money? No   Do you ever drive or ride in a car without wearing a seat belt? No   Have you felt unusual stress, anger or loneliness in the last month? No   Who do you live with? Spouse   If you need help, do you have trouble finding someone available to you? No   Have you been bothered in the last four weeks by sexual problems? No   Do you have difficulty concentrating, remembering or making decisions? No       Does the patient have evidence of cognitive impairment?  "no    No opioid medication identified on active medication list. I have reviewed chart for other potential  high risk medication/s and harmful drug interactions in the elderly.          Does not need ASA (and currently is not on it)    Vitals:    07/02/24 1545   BP: 118/72   BP Location: Left arm   Patient Position: Sitting   Cuff Size: Adult   Pulse: 72   Temp: 97.5 °F (36.4 °C)   TempSrc: Infrared   SpO2: 99%   Weight: 82.2 kg (181 lb 3.2 oz)   Height: 175.3 cm (69.02\")       Body mass index is 26.75 kg/m².    Visual Acuity:  No results found.    Advanced Care Planning:  ACP discussion was held with the patient during this visit. Patient has an advance directive (not in EMR), copy requested.    Compared to one year ago, the patient feels physical health is better.  Compared to one year ago, the patient feels mental health is the same.    Reviewed chart for potential of high risk medication in the elderly: yes  Reviewed chart for potential of harmful drug interactions in the elderly:yes    Identification of Risk Factors:  Risk factors include: Advance Directive Discussion  Breast Cancer/Mammogram Screening  Cardiovascular risk  Colon Cancer Screening  Depression/Dysphoria  Diabetic Lab Screening   Immunizations Discussed/Encouraged (specific immunizations; Tdap, Hepatitis A Vaccine/Series, Influenza, Pneumococcal 23, Shingrix, COVID19, and RSV (Respiratory Syncytial Virus) )  Inactivity/Sedentary  Obesity/Overweight .    Patient Self-Management and Personalized Health Advice  The patient has been provided with information about: diet, exercise, weight management, and mental health concerns and preventive services including:   Annual Wellness Visit (AWV)  Bone Density Measurements  Colorectal Cancer Screening, Colonoscopy  Screening Mammography   Screening Pap Tests.  Follow Up: Medicare visit in one year    Discussed the patient's BMI with her. The BMI is above average; BMI management plan is completed.    Patient " has multiple medical problems which are significant and separately identifiable that require additional work above and beyond the Medicare Wellness Visit. These are not trivial or insignificant and are billed with a 25-modifier    Chief Complaint   Patient presents with    Annual Exam    Suspicious Skin Lesion     Outer part of left ear       HPI:  Kisha Bourgeois is a 63 y.o. female RTC in yearly AWV, review of medical issues:   1. recent eval for low pressure events and vertiginous sx/ positional lightheadedness.  Recent cardiac eval with Holter monitor and echo unrevealing.  Responded to salt tabs from neurology with improved symptoms and fluid intake. BAILEE at this time.   2. Hypothyroidism - TSH variable over time, but at goal on levothyroxine daily on empty stomach. However, advised to change and take without other pills, first AM.  Trend TSH.  3. Bipolar/ Schizoaffective D/O with prominent anxiety sx element - 'stable'.  managed per psych, Dr. Betts, on multidrug regimen.  no med changes.  No manic events over several year.   4. Essential Tremor - progressive despite high dose B-blocker, now off after syncope.  ? Complicated by lithium and Depakote.   Tried amantadine and not helpful.  Primidone helpful and did not tolerated TID due to brain fog.  Back to BID and has some benefit and resolved brain fog.   5. MUNIRA,  intolerant to CPAP -  re- eval in 2023 with positive study, but not Inspire candidate.  Declined additional CPAP and already on dental biteguard.  No change in pattern or increase in snoring.   6. HLD - on statin.  No issues.   7. Hyperprolactinemia, s/p ptiuitary surgery ~200; stable f/u MRI in 2014 after likely false elevation of Prolactin by psych meds - has been decreasing dosing and no issues as come down. Had labs yesterday and will seen Endo upcoming.   Planing to wean off as able.     Review of Systems   Constitutional: Negative for chills, fever and malaise/fatigue.   HENT:  Negative  for congestion, hearing loss, odynophagia and sore throat.    Eyes:  Negative for discharge, double vision, pain and redness.        Last eye exam ~8/2023; no issues    Recent AM swelling, improved with allergy pill in AM and OTC allergy drop     Cardiovascular:  Negative for chest pain, dyspnea on exertion, irregular heartbeat, leg swelling, near-syncope, palpitations and syncope.   Respiratory:  Negative for cough and shortness of breath.    Endocrine: Negative for polydipsia, polyphagia and polyuria.   Hematologic/Lymphatic: Negative for bleeding problem. Does not bruise/bleed easily.   Skin:  Positive for suspicious lesions (rough spot on L ear at pinna, 'not healing'.;present month). Negative for rash.   Musculoskeletal:  Negative for joint pain, joint swelling, muscle cramps, muscle weakness and myalgias.   Gastrointestinal:  Negative for constipation, diarrhea, dysphagia, heartburn, nausea and vomiting.   Genitourinary:  Negative for dysuria, frequency, hematuria, hesitancy and incomplete emptying.        Last Pap    Neurological:  Positive for light-headedness ('a little, at times';improved on primidone) and tremors. Negative for dizziness and headaches.   Psychiatric/Behavioral:  Negative for altered mental status and depression. The patient does not have insomnia and is not nervous/anxious.    Allergic/Immunologic: Negative for environmental allergies and persistent infections.     @OBJECTIVE BEGIN@  Vitals:    07/02/24 1545   BP: 118/72   Pulse: 72   Temp: 97.5 °F (36.4 °C)   SpO2: 99%     Physical Exam  Vitals reviewed. Exam conducted with a chaperone present (LORE Mcgowan).   Constitutional:       General: She is not in acute distress.     Appearance: Normal appearance. She is well-developed. She is not ill-appearing or toxic-appearing.   HENT:      Head: Normocephalic and atraumatic.      Right Ear: Hearing, tympanic membrane, ear canal and external ear normal. There is no impacted cerumen.      Left Ear:  Hearing, tympanic membrane, ear canal and external ear normal. There is no impacted cerumen.      Nose: Nose normal.      Mouth/Throat:      Mouth: Mucous membranes are moist.      Pharynx: Oropharynx is clear. Uvula midline. No oropharyngeal exudate.   Eyes:      General: Lids are normal. No scleral icterus.     Extraocular Movements: Extraocular movements intact.      Conjunctiva/sclera: Conjunctivae normal.      Pupils: Pupils are equal, round, and reactive to light.   Neck:      Thyroid: No thyroid mass or thyromegaly.      Vascular: No carotid bruit.      Trachea: Trachea normal.   Cardiovascular:      Rate and Rhythm: Normal rate and regular rhythm.      Pulses:           Radial pulses are 2+ on the right side and 2+ on the left side.        Dorsalis pedis pulses are 2+ on the right side and 2+ on the left side.        Posterior tibial pulses are 2+ on the right side and 2+ on the left side.      Heart sounds: Normal heart sounds, S1 normal and S2 normal. No murmur heard.     No friction rub. No gallop.   Pulmonary:      Effort: Pulmonary effort is normal. No respiratory distress.      Breath sounds: Normal breath sounds. No wheezing, rhonchi or rales.   Chest:      Chest wall: No mass.   Breasts:     Right: No swelling, bleeding, inverted nipple, mass, nipple discharge or skin change.      Left: No swelling, bleeding, inverted nipple, mass, nipple discharge or skin change.   Abdominal:      General: Bowel sounds are normal. There is no distension.      Palpations: Abdomen is soft. There is no mass.      Tenderness: There is no abdominal tenderness. There is no guarding or rebound.   Genitourinary:     Exam position: Supine.      Labia:         Right: Lesion (diffuse yellow, firm nodular lesiosn throughout labia) present. No rash, tenderness or injury.         Left: Lesion (diffuse yellow, firm nodular lesiosn throughout labia) present. No rash, tenderness or injury.       Urethra: No prolapse or urethral  lesion.   Musculoskeletal:         General: Normal range of motion.      Cervical back: Full passive range of motion without pain, normal range of motion and neck supple.      Right lower leg: No edema.      Left lower leg: No edema.      Right foot: Normal range of motion. No deformity or bunion.      Left foot: Normal range of motion. No deformity or bunion.   Feet:      Right foot:      Skin integrity: No ulcer, blister or skin breakdown.      Left foot:      Skin integrity: No ulcer, blister or skin breakdown.   Lymphadenopathy:      Cervical: No cervical adenopathy.      Right cervical: No superficial, deep or posterior cervical adenopathy.     Left cervical: No superficial, deep or posterior cervical adenopathy.      Upper Body:      Right upper body: No supraclavicular, axillary or pectoral adenopathy.      Left upper body: No supraclavicular, axillary or pectoral adenopathy.      Lower Body: No right inguinal adenopathy. No left inguinal adenopathy.   Skin:     General: Skin is warm and dry.      Findings: Lesion (papular lesion with pearly coloration with telangiectasias noted throughout and central ulceration, L ear pinna) present. No rash.   Neurological:      Mental Status: She is alert and oriented to person, place, and time.      Cranial Nerves: No cranial nerve deficit, dysarthria or facial asymmetry.      Sensory: No sensory deficit.      Motor: Tremor (R > L action) present. No weakness, atrophy or abnormal muscle tone.      Gait: Gait normal.      Deep Tendon Reflexes: Reflexes are normal and symmetric.      Reflex Scores:       Patellar reflexes are 2+ on the right side and 2+ on the left side.       Achilles reflexes are 2+ on the right side and 2+ on the left side.     Comments: Some mild rhythmic chewing motion of jaw noted at times   Psychiatric:         Attention and Perception: Attention normal.         Mood and Affect: Mood normal. Affect is flat (slightly, unchanged from prior). Affect is  not angry or inappropriate.         Speech: Speech normal.         Behavior: Behavior normal.         Thought Content: Thought content normal.             Assessment & Plan   Diagnoses and all orders for this visit:    1. Encounter for subsequent annual wellness visit (AWV) in Medicare patient (Primary)    2. Encounter for general adult medical examination with abnormal findings    3. Schizoaffective disorder, bipolar type    4. Obstructive sleep apnea syndrome    5. Hypothyroidism, unspecified type    6. Hyperprolactinemia    7. Mixed hyperlipidemia    8. History of pituitary adenoma    9. Hereditary essential tremor    10. Bipolar 1 disorder    11. Adenomatous polyp of colon, unspecified part of colon    12. Abnormal electrocardiogram    13. Screening for cervical cancer  -     IGP,rfx Aptima HPV All Pth; Future  -     IGP,rfx Aptima HPV All Pth    14. Skin lesion of left external ear  -     Ambulatory Referral to Dermatology    15. Screening breast examination    16. Encounter for screening mammogram for malignant neoplasm of breast  -     Mammo Screening Digital Tomosynthesis Bilateral With CAD; Future          Diagnoses and all orders for this visit:    Encounter for subsequent annual wellness visit (AWV) in Medicare patient    Encounter for general adult medical examination with abnormal findings    Schizoaffective disorder, bipolar type    Obstructive sleep apnea syndrome    Hypothyroidism, unspecified type    Hyperprolactinemia    Mixed hyperlipidemia    History of pituitary adenoma    Hereditary essential tremor    Bipolar 1 disorder    Adenomatous polyp of colon, unspecified part of colon    Abnormal electrocardiogram    Screening for cervical cancer  -     IGP,rfx Aptima HPV All Pth; Future  -     IGP,rfx Aptima HPV All Pth    Skin lesion of left external ear  -     Ambulatory Referral to Dermatology    Screening breast examination    Encounter for screening mammogram for malignant neoplasm of breast  -      Mammo Screening Digital Tomosynthesis Bilateral With CAD; Future    Other orders  -     lithium 300 MG tablet; Take 1 tablet by mouth 2 (Two) Times a Day.      Kisha Bourgeois is a 63 y.o. female RTC in yearly AWV, review of medical issues:   1. Hx of low pressure events and vertiginous sx/ positional lightheadedness - cardiac eval with Holter monitor and echo unrevealing.  Responding to salt tabs from neurology with improved symptoms and fluid intake.  Largely resolved this time, noting SX actually better on increasing doses of primidone.  Issue deferred today   2. Hypothyroidism - TSH at goal on levothyroxine daily on empty stomach. C/W same.   3. Bipolar/ Schizoaffective D/O with prominent anxiety sx element - 'stable'.  managed per psych, Dr. Betts, on multidrug regimen.    --> ? Mild tardive dyskinesia - noted on exam, potential Trintellix, Saphris contribution.  Has mvment d/o clinic appt, defer to their eval.  4. Essential Tremor - progressive despite high dose B-blocker,  off after syncope.  ? Complicated by lithium and Depakote.  Failed trial of amantadine.  DaTscan negative, per neurology.  Improved SX on increasing dose of primidone, but did not tolerate 3 times daily dosing.  Overall doing well at this time.  F/U neurology as planned.  5. MUNIRA,  intolerant to CPAP -  re- eval in 2023 with positive study, but not Inspire candidate.  Declined additional CPAP and already on dental biteguard.  Declines tx of dx at this time.   6. HLD -LDL at goal  on statin.  LFT's OK.    7. Hyperprolactinemia, s/p ptiuitary surgery ~200; stable f/u MRI in 2014 after likely false elevation of Prolactin by psych meds - decreasing bromocriptine dosing per endocrinology evaluation, reviewed 4/2024 note.  Recent prolactin check and upcoming Endo appointment planned.   8.  Left ear pinna papular lesion with central ulceration, high concern for BCC -new mention today.  D/W patient concern for BCC and absolute need to see  dermatology for biopsy and excision.  Referral placed, patient has established dermatologist already;  Will make appointment.  9. HM - labs d/w pt; Flu/ Tdap/ Prevnar/ COVID - UTD;  Shingrix #2/ RSV at pharmacy, counseled; C-scope 2013 (1 polyp) --> (-) 10/2023; Pap (-) 6/2021 --> completed today;  Mammo due, ordered; breast exam OK;   Hep C Ab (-) 8/2017; add exercise; Preventative care plan provided to pt at end of visit  --> Doorknob mention of tailbone pain for 9 months after a fall on area.  Did not seek care.  Has some pain with sitting for long periods of time.  Imaging deferred today is not likely to  given location and duration of pain.  May make appointment for reassessment due to time constraints if issues persist.    Return in about 1 year (around 7/2/2025) for Medicare Wellness. (Include prolactin level)          Current Outpatient Medications:     ALPRAZolam (XANAX) 0.5 MG tablet, Take 1 tablet by mouth 2 (Two) Times a Day As Needed for Anxiety., Disp: , Rfl:     bromocriptine (PARLODEL) 2.5 MG tablet, TAKE 2 TABLETS EVERY       MORNING AND TAKE 2 TABLETS EVERY EVENING., Disp: 180 tablet, Rfl: 2    buPROPion XL (WELLBUTRIN XL) 150 MG 24 hr tablet, 1 tablet Every Morning., Disp: , Rfl:     levothyroxine (Synthroid) 100 MCG tablet, Take 1 tablet by mouth Daily., Disp: 90 tablet, Rfl: 3    lithium 300 MG tablet, Take 1 tablet by mouth 2 (Two) Times a Day., Disp: , Rfl:     primidone (MYSOLINE) 50 MG tablet, Take 2 tablets by mouth Every Night., Disp: , Rfl:     SAPHRIS 10 MG sublingual tablet sublingual tablet, 1 tablet Every Night., Disp: , Rfl:     simvastatin (ZOCOR) 40 MG tablet, TAKE 1 TABLET DAILY AS     DIRECTED, Disp: 90 tablet, Rfl: 2    Vortioxetine HBr (TRINTELLIX) 10 MG tablet tablet, Take 1 tablet by mouth Daily With Breakfast., Disp: , Rfl:

## 2024-07-02 NOTE — PATIENT INSTRUCTIONS
Medicare Wellness  Personal Prevention Plan of Service     Date of Office Visit:    Encounter Provider:  Mark Sanchez MD  Place of Service:  [facility]  Patient Name: Kisha Bourgeois  :  1960    As part of the Medicare Wellness portion of your visit today, we are providing you with this personalized preventive plan of services (PPPS). This plan is based upon recommendations of the [place] Preventive Services Task Force (USPSTF) and the Advisory Committee on Immunization Practices (ACIP).    This lists the preventive care services that should be considered, and provides dates of when you are due. Items listed as completed are up-to-date and do not require any further intervention.    Health Maintenance   Topic Date Due    ZOSTER VACCINE (2 of 2) 2022    RSV Vaccine - Adults (1 - 1-dose 60+ series) Never done    INFLUENZA VACCINE  2024    MAMMOGRAM  2025    LIPID PANEL  2025    ANNUAL WELLNESS VISIT  2025    BMI FOLLOWUP  2025    PAP SMEAR  2027    COLORECTAL CANCER SCREENING  10/11/2028    TDAP/TD VACCINES (3 - Td or Tdap) 2031    HEPATITIS C SCREENING  Completed    COVID-19 Vaccine  Completed    Pneumococcal Vaccine 0-64  Aged Out       Orders Placed This Encounter   Procedures    Ambulatory Referral to Dermatology     Referral Priority:   Routine     Referral Type:   Consultation     Referral Reason:   Specialty Services Required     Requested Specialty:   Dermatology     Number of Visits Requested:   1       Return in about 1 year (around 2025) for Medicare Wellness.

## 2024-07-06 LAB
CONV .: NORMAL
CYTOLOGIST CVX/VAG CYTO: NORMAL
CYTOLOGY CVX/VAG DOC CYTO: NORMAL
CYTOLOGY CVX/VAG DOC THIN PREP: NORMAL
DX ICD CODE: NORMAL
Lab: NORMAL
OTHER STN SPEC: NORMAL
STAT OF ADQ CVX/VAG CYTO-IMP: NORMAL

## 2024-12-03 ENCOUNTER — HOSPITAL ENCOUNTER (OUTPATIENT)
Dept: MAMMOGRAPHY | Facility: HOSPITAL | Age: 64
Discharge: HOME | End: 2024-12-03
Admitting: INTERNAL MEDICINE
Payer: COMMERCIAL

## 2024-12-03 DIAGNOSIS — Z12.31 ENCOUNTER FOR SCREENING MAMMOGRAM FOR MALIGNANT NEOPLASM OF BREAST: ICD-10-CM

## 2024-12-03 PROCEDURE — 77063 BREAST TOMOSYNTHESIS BI: CPT

## 2024-12-03 PROCEDURE — 77067 SCR MAMMO BI INCL CAD: CPT

## 2025-02-27 RX ORDER — SIMVASTATIN 40 MG
40 TABLET ORAL DAILY
Qty: 90 TABLET | Refills: 2 | Status: SHIPPED | OUTPATIENT
Start: 2025-02-27

## 2025-02-27 NOTE — TELEPHONE ENCOUNTER
Caller: Kisha Bourgeois    Relationship: Self    Best call back number: [phone]     Requested Prescriptions:   Requested Prescriptions     Pending Prescriptions Disp Refills    simvastatin (ZOCOR) 40 MG tablet 90 tablet 2     Sig: Take 1 tablet by mouth Daily. as directed        Pharmacy where request should be sent: [facility] [id number] [facility]  - [phone] [facility] [phone] FX     Last office visit with prescribing clinician: 7/2/2024   Last telemedicine visit with prescribing clinician: Visit date not found   Next office visit with prescribing clinician: Visit date not found     Additional details provided by patient:     Does the patient have less than a 3 day supply:  [x] Yes  [] No    Would you like a call back once the refill request has been completed: [] Yes [] No    If the office needs to give you a call back, can they leave a voicemail: [] Yes [] No    April Lyndon Mendoza Rep   02/27/25 10:26 EST

## 2025-05-21 NOTE — PATIENT INSTRUCTIONS
Medicare Wellness  Personal Prevention Plan of Service     Date of Office Visit:  2017  Encounter Provider:  Mark Sanchez MD  Place of Service:  [facility]  Patient Name: Kisha Bourgeois  :  1960    As part of the Medicare Wellness portion of your visit today, we are providing you with this personalized preventive plan of services (PPPS). This plan is based upon recommendations of the [place] Preventive Services Task Force (USPSTF) and the Advisory Committee on Immunization Practices (ACIP).    This lists the preventive care services that should be considered, and provides dates of when you are due. Items listed as completed are up-to-date and do not require any further intervention.    Health Maintenance   Topic Date Due   • PAP SMEAR  2016   • MAMMOGRAM  2017 (Originally 2016)   • INFLUENZA VACCINE  2017   • LIPID PANEL  08/10/2018   • MEDICARE ANNUAL WELLNESS  2018   • TDAP/TD VACCINES (2 - Td) 2021   • COLONOSCOPY  2023   • HEPATITIS C SCREENING  Completed       Orders Placed This Encounter   Procedures   • Mammo Screening Bilateral With CAD     Standing Status:   Future     Standing Expiration Date:   2018     Order Specific Question:   Reason for Exam:     Answer:   Screening     Order Specific Question:   Patient Pregnant     Answer:   No   • Ambulatory Referral to Sleep Medicine     Referral Priority:   Routine     Referral Type:   Consultation     Referral Reason:   Specialty Services Required     Requested Specialty:   Sleep Medicine     Number of Visits Requested:   1       Return in about 1 year (around 2018) for Annual physical.           Copied from CRM #[id number]. Topic: MW Schedule Appointment  >> May 21, 2025 12:25 PM Virginia DICKSON wrote:  --DO NOT REPLY - Sent from PACT - If sent to wrong pool, reroute to P [facility] Reroute pool --    Reason for Appointment Message: Peds specialty appointment  Requested Provider:  karissa arteaga  Specialty:  Peds specialties: Peds Developmental   Has your child expressed wanting to hurt or kill themselves or others?   Y/N: No- Ask next question.   Is your child currently a danger to themselves or others?   Y/N: No- Send appt request and inform of long wait time.    Reason for appointment:  down syndrome  Referred by: taylor suarez MD  Patient scheduling preference: EARLIEST AS POSSIBLE  Callback #: [phone]  Can a detailed message be left? Yes - Voicemail   Caller has been advised this message will be addressed within:2-3 business days [routine]

## (undated) DEVICE — KT ORCA ORCAPOD DISP STRL

## (undated) DEVICE — LN SMPL CO2 SHTRM SD STREAM W/M LUER

## (undated) DEVICE — ADAPT CLN BIOGUARD AIR/H2O DISP

## (undated) DEVICE — TUBING, SUCTION, 1/4" X 10', STRAIGHT: Brand: MEDLINE

## (undated) DEVICE — SENSR O2 OXIMAX FNGR A/ 18IN NONSTR

## (undated) DEVICE — CANN O2 ETCO2 FITS ALL CONN CO2 SMPL A/ 7IN DISP LF